# Patient Record
Sex: MALE | Race: WHITE | NOT HISPANIC OR LATINO | Employment: OTHER | URBAN - METROPOLITAN AREA
[De-identification: names, ages, dates, MRNs, and addresses within clinical notes are randomized per-mention and may not be internally consistent; named-entity substitution may affect disease eponyms.]

---

## 2020-07-13 ENCOUNTER — LAB VISIT (OUTPATIENT)
Dept: URGENT CARE | Facility: CLINIC | Age: 50
End: 2020-07-13
Payer: COMMERCIAL

## 2020-07-13 VITALS — TEMPERATURE: 99 F | HEART RATE: 81 BPM | OXYGEN SATURATION: 97 %

## 2020-07-13 DIAGNOSIS — Z01.818 PRE-OP TESTING: ICD-10-CM

## 2020-07-13 PROCEDURE — U0003 INFECTIOUS AGENT DETECTION BY NUCLEIC ACID (DNA OR RNA); SEVERE ACUTE RESPIRATORY SYNDROME CORONAVIRUS 2 (SARS-COV-2) (CORONAVIRUS DISEASE [COVID-19]), AMPLIFIED PROBE TECHNIQUE, MAKING USE OF HIGH THROUGHPUT TECHNOLOGIES AS DESCRIBED BY CMS-2020-01-R: HCPCS

## 2020-07-14 LAB — SARS-COV-2 RNA RESP QL NAA+PROBE: NOT DETECTED

## 2020-07-15 ENCOUNTER — ANESTHESIA EVENT (OUTPATIENT)
Dept: ENDOSCOPY | Facility: OTHER | Age: 50
End: 2020-07-15
Payer: COMMERCIAL

## 2020-07-15 ENCOUNTER — HOSPITAL ENCOUNTER (OUTPATIENT)
Facility: OTHER | Age: 50
Discharge: HOME | End: 2020-07-15
Attending: INTERNAL MEDICINE | Admitting: INTERNAL MEDICINE
Payer: COMMERCIAL

## 2020-07-15 ENCOUNTER — ANESTHESIA (OUTPATIENT)
Dept: ENDOSCOPY | Facility: OTHER | Age: 50
End: 2020-07-15
Payer: COMMERCIAL

## 2020-07-15 VITALS
OXYGEN SATURATION: 98 % | BODY MASS INDEX: 27.09 KG/M2 | TEMPERATURE: 98 F | HEART RATE: 64 BPM | RESPIRATION RATE: 16 BRPM | DIASTOLIC BLOOD PRESSURE: 71 MMHG | HEIGHT: 72 IN | WEIGHT: 200 LBS | SYSTOLIC BLOOD PRESSURE: 120 MMHG

## 2020-07-15 DIAGNOSIS — Z01.818 PRE-OP TESTING: ICD-10-CM

## 2020-07-15 DIAGNOSIS — K64.9 HEMORRHOIDS, UNSPECIFIED HEMORRHOID TYPE: Primary | ICD-10-CM

## 2020-07-15 PROCEDURE — 37000008 HC ANESTHESIA 1ST 15 MINUTES: Performed by: INTERNAL MEDICINE

## 2020-07-15 PROCEDURE — 37000009 HC ANESTHESIA EA ADD 15 MINS: Performed by: INTERNAL MEDICINE

## 2020-07-15 PROCEDURE — 63600175 PHARM REV CODE 636 W HCPCS: Performed by: NURSE ANESTHETIST, CERTIFIED REGISTERED

## 2020-07-15 PROCEDURE — G0121 COLON CA SCRN NOT HI RSK IND: HCPCS | Performed by: INTERNAL MEDICINE

## 2020-07-15 PROCEDURE — 63600175 PHARM REV CODE 636 W HCPCS: Performed by: ANESTHESIOLOGY

## 2020-07-15 RX ORDER — MEPERIDINE HYDROCHLORIDE 25 MG/ML
12.5 INJECTION INTRAMUSCULAR; INTRAVENOUS; SUBCUTANEOUS ONCE AS NEEDED
Status: DISCONTINUED | OUTPATIENT
Start: 2020-07-15 | End: 2020-07-15 | Stop reason: HOSPADM

## 2020-07-15 RX ORDER — ONDANSETRON 2 MG/ML
4 INJECTION INTRAMUSCULAR; INTRAVENOUS DAILY PRN
Status: DISCONTINUED | OUTPATIENT
Start: 2020-07-15 | End: 2020-07-15 | Stop reason: HOSPADM

## 2020-07-15 RX ORDER — BUPROPION HYDROCHLORIDE 100 MG/1
100 TABLET, EXTENDED RELEASE ORAL 2 TIMES DAILY
COMMUNITY
End: 2022-05-27

## 2020-07-15 RX ORDER — PROPOFOL 10 MG/ML
VIAL (ML) INTRAVENOUS
Status: DISCONTINUED | OUTPATIENT
Start: 2020-07-15 | End: 2020-07-15

## 2020-07-15 RX ORDER — METOPROLOL TARTRATE 50 MG/1
50 TABLET ORAL 2 TIMES DAILY
COMMUNITY

## 2020-07-15 RX ORDER — OXYCODONE HYDROCHLORIDE 5 MG/1
5 TABLET ORAL
Status: DISCONTINUED | OUTPATIENT
Start: 2020-07-15 | End: 2020-07-15 | Stop reason: HOSPADM

## 2020-07-15 RX ORDER — SODIUM CHLORIDE, SODIUM LACTATE, POTASSIUM CHLORIDE, CALCIUM CHLORIDE 600; 310; 30; 20 MG/100ML; MG/100ML; MG/100ML; MG/100ML
INJECTION, SOLUTION INTRAVENOUS CONTINUOUS PRN
Status: DISCONTINUED | OUTPATIENT
Start: 2020-07-15 | End: 2020-07-15

## 2020-07-15 RX ORDER — SODIUM CHLORIDE 0.9 % (FLUSH) 0.9 %
3 SYRINGE (ML) INJECTION
Status: DISCONTINUED | OUTPATIENT
Start: 2020-07-15 | End: 2020-07-15 | Stop reason: HOSPADM

## 2020-07-15 RX ORDER — HYDROMORPHONE HYDROCHLORIDE 2 MG/ML
0.4 INJECTION, SOLUTION INTRAMUSCULAR; INTRAVENOUS; SUBCUTANEOUS EVERY 5 MIN PRN
Status: DISCONTINUED | OUTPATIENT
Start: 2020-07-15 | End: 2020-07-15 | Stop reason: HOSPADM

## 2020-07-15 RX ADMIN — PROPOFOL 50 MG: 10 INJECTION, EMULSION INTRAVENOUS at 07:07

## 2020-07-15 RX ADMIN — SODIUM CHLORIDE, SODIUM LACTATE, POTASSIUM CHLORIDE, AND CALCIUM CHLORIDE: 600; 310; 30; 20 INJECTION, SOLUTION INTRAVENOUS at 06:07

## 2020-07-15 NOTE — H&P
[address]  Gastroenterology  H&P    Patient Name: Jake Wallace  MRN: [id number]  Admission Date: 7/15/2020  Code Status: Full Code    Attending Provider:   Primary Care Physician: Eloy Nguyen MD  Principal Problem:<principal problem not specified>    Subjective:     History of Present Illness:  This gent is here for a colon screening and he has no acute GI complaints. He is healthy and is on a beta blocker and anti-coagulant for atrial fibrillation. The procedure was explained in detail.    Past Medical History:   Diagnosis Date    Atrial fibrillation     Generalized anxiety disorder     Hypercholesteremia     Hypogonadism in male     Vitamin D deficiency        History reviewed. No pertinent surgical history.    Review of patient's allergies indicates:   Allergen Reactions    Penicillins      Family History     None        Tobacco Use    Smoking status: Never Smoker    Smokeless tobacco: Never Used   Substance and Sexual Activity    Alcohol use: Not on file    Drug use: Not on file    Sexual activity: Not on file     Review of Systems   Constitutional: Negative.    HENT: Negative.    Eyes: Negative.    Respiratory: Negative.    Cardiovascular: Positive for palpitations.   Gastrointestinal: Negative.    Endocrine: Negative.    Genitourinary: Negative.    Musculoskeletal: Negative.    Skin: Negative.    Allergic/Immunologic: Negative.    Neurological: Negative.    Hematological: Negative.    Psychiatric/Behavioral: Negative.      Objective:     Vital Signs (Most Recent):  Temp: 98.1 °F (36.7 °C) (07/15/20 0624)  Pulse: 66 (07/15/20 0624)  Resp: 16 (07/15/20 0624)  BP: (!) 146/83 (07/15/20 0624)  SpO2: 98 % (07/15/20 0624) Vital Signs (24h Range):  Temp:  [98.1 °F (36.7 °C)] 98.1 °F (36.7 °C)  Pulse:  [66] 66  Resp:  [16] 16  SpO2:  [98 %] 98 %  BP: (146)/(83) 146/83     Weight: 90.7 kg (200 lb) (07/15/20 0624)  Body mass index is 27.12 kg/m².    No intake or output data in the  24 hours ending 07/15/20 0656    Lines/Drains/Airways     Peripheral Intravenous Line                 Peripheral IV - Single Lumen 07/15/20 0641 20 G Left Hand less than 1 day                Physical Exam  Vitals signs and nursing note reviewed. Exam conducted with a chaperone present.   Constitutional:       Appearance: Normal appearance.   HENT:      Head: Normocephalic and atraumatic.      Nose: Nose normal.      Mouth/Throat:      Mouth: Mucous membranes are moist.   Eyes:      General: No scleral icterus.     Extraocular Movements: Extraocular movements intact.      Pupils: Pupils are equal, round, and reactive to light.   Neck:      Musculoskeletal: Normal range of motion and neck supple.   Cardiovascular:      Rate and Rhythm: Normal rate.      Heart sounds: No murmur. No gallop.    Pulmonary:      Effort: Pulmonary effort is normal.      Breath sounds: Normal breath sounds.   Abdominal:      General: Abdomen is flat.      Palpations: Abdomen is soft.   Musculoskeletal: Normal range of motion.   Skin:     General: Skin is warm and dry.   Neurological:      General: No focal deficit present.      Mental Status: He is alert and oriented to person, place, and time.   Psychiatric:         Mood and Affect: Mood normal.         Behavior: Behavior normal.         Thought Content: Thought content normal.         Judgment: Judgment normal.         Significant Labs:  none    Significant Imaging:  none    Assessment/Plan: This gent is quite stable for colon screening     Active Diagnoses:    Diagnosis Date Noted POA    Pre-op testing [Z01.818] 07/15/2020 Not Applicable      Problems Resolved During this Admission:           Nitin Raymond MD  Gastroenterology  [facility]-[address]

## 2020-07-15 NOTE — PROVATION PATIENT INSTRUCTIONS
Discharge Summary/Instructions after an Endoscopic Procedure  Patient Name: Jake Wallace  Patient MRN: [id number]  Patient YOB: 1970  Wednesday, July 15, 2020  Nitin Raymond MD  RESTRICTIONS:  During your procedure today, you received medications for sedation.  These   medications may affect your judgment, balance and coordination.  Therefore,   for 24 hours, you have the following restrictions:   - DO NOT drive a car, operate machinery, make legal/financial decisions,   sign important papers or drink alcohol.    ACTIVITY:  Today: no heavy lifting, straining or running due to procedural   sedation/anesthesia.  The following day: return to full activity including work.  DIET:  Eat and drink normally unless instructed otherwise.     TREATMENT FOR COMMON SIDE EFFECTS:  - Mild abdominal pain, nausea, belching, bloating or excessive gas:  rest,   eat lightly and use a heating pad.  - Sore Throat: treat with throat lozenges and/or gargle with warm salt   water.  - Because air was used during the procedure, expelling large amounts of air   from your rectum or belching is normal.  - If a bowel prep was taken, you may not have a bowel movement for 1-3 days.    This is normal.  SYMPTOMS TO WATCH FOR AND REPORT TO YOUR PHYSICIAN:  1. Abdominal pain or bloating, other than gas cramps.  2. Chest pain.  3. Back pain.  4. Signs of infection such as: chills or fever occurring within 24 hours   after the procedure.  5. Rectal bleeding, which would show as bright red, maroon, or black stools.   (A tablespoon of blood from the rectum is not serious, especially if   hemorrhoids are present.)  6. Vomiting.  7. Weakness or dizziness.  GO DIRECTLY TO THE NEAREST EMERGENCY ROOM IF YOU HAVE ANY OF THE FOLLOWING:      Difficulty breathing              Chills and/or fever over 101 F   Persistent vomiting and/or vomiting blood   Severe abdominal pain   Severe chest pain   Black, tarry stools   Bleeding- more than one  tablespoon   Any other symptom or condition that you feel may need urgent attention  Your doctor recommends these additional instructions:  If any biopsies were taken, your doctors clinic will contact you in 1 to 2   weeks with any results.  - Discharge patient to home (ambulatory).   - Resume previous diet.   - Continue present medications.   - Repeat colonoscopy in 10 years for surveillance.   - Return to GI office PRN.   - Return to primary care physician PRN.  For questions, problems or results please call your physician - Nitin Raymond MD at Work:  [phone].  [facility], EMERGENCY ROOM PHONE NUMBER: [phone], [facility], operated by [facility]   [phone].  IF A COMPLICATION OR EMERGENCY SITUATION ARISES AND YOU ARE UNABLE TO REACH   YOUR PHYSICIAN - GO DIRECTLY TO THE EMERGENCY ROOM.  Nitin Raymond MD  7/15/2020 7:36:25 AM  This report has been verified and signed electronically.  [employer]

## 2020-07-15 NOTE — PLAN OF CARE
Jake Wallace has met all discharge criteria from Phase II. Vital Signs are stable, ambulating  without difficulty. Discharge instructions given, patient verbalized understanding. Discharged from facility via wheelchair in stable condition.

## 2020-07-15 NOTE — ANESTHESIA PREPROCEDURE EVALUATION
07/15/2020  Jake Wallace is a 49 y.o., male.    Anesthesia Evaluation    I have reviewed the Patient Summary Reports.    I have reviewed the Nursing Notes. I have reviewed the NPO Status.      Review of Systems  Anesthesia Hx:  No problems with previous Anesthesia    Social:  Non-Smoker    Cardiovascular:   Dysrhythmias atrial fibrillation    Pulmonary:  Pulmonary Normal    Hepatic/GI:  Hepatic/GI Normal    Endocrine:  Endocrine Normal    Psych:   Psychiatric History          Physical Exam  General:  Well nourished    Airway/Jaw/Neck:  Airway Findings: Mouth Opening: Normal Tongue: Normal  General Airway Assessment: Adult  Mallampati: II  TM Distance: Normal, at least 6 cm  Jaw/Neck Findings:     Neck ROM: Normal ROM      Dental:  Dental Findings: In tact             Anesthesia Plan  Type of Anesthesia, risks & benefits discussed:  Anesthesia Type:  general  Patient's Preference:   Intra-op Monitoring Plan:   Intra-op Monitoring Plan Comments:   Post Op Pain Control Plan:   Post Op Pain Control Plan Comments:   Induction:   IV  Beta Blocker:         Informed Consent: Patient understands risks and agrees with Anesthesia plan.  Questions answered. Anesthesia consent signed with patient.  ASA Score: 3     Day of Surgery Review of History & Physical:    H&P update referred to the surgeon.         Ready For Surgery From Anesthesia Perspective.

## 2020-07-15 NOTE — OP NOTE
This gent underwent an uncomplicated colonoscopy for screeing purposes.    Normal mucosa  Internal hemorrhoids  No polyps.    No complications.  Plan: resume medications and normal diet  Colonoscopy in 10 years

## 2020-07-15 NOTE — DISCHARGE INSTRUCTIONS
Anesthesia: Monitored Anesthesia Care (MAC)    Anesthesia Safety  · Have an adult family member or friend drive you home after the procedure.  · For the first 24 hours after your surgery:  ¨ Do not drive or use heavy equipment.  ¨ Do not make important decisions or sign documents.  ¨ Avoid alcohol.  ¨ Have someone stay with you, if possible. They can watch for problems and help keep you safe.    PLEASE FOLLOW ANY ADDITIONAL INSTRUCTIONS FROM DR. AMEZQUITA

## 2020-07-15 NOTE — ANESTHESIA POSTPROCEDURE EVALUATION
Anesthesia Post Evaluation    Patient: Jake Wallace    Procedure(s) Performed: Procedure(s) (LRB):  COLONOSCOPY (N/A)    Final Anesthesia Type: general    Patient location during evaluation: [facility]  Patient participation: Yes- Able to Participate  Level of consciousness: awake and alert  Post-procedure vital signs: reviewed and stable  Pain management: adequate  Airway patency: patent    PONV status at discharge: No PONV  Anesthetic complications: no      Cardiovascular status: blood pressure returned to baseline  Respiratory status: unassisted, room air and spontaneous ventilation  Hydration status: euvolemic  Follow-up not needed.          Vitals Value Taken Time   /83 07/15/20 0624   Temp 36.7 °C (98.1 °F) 07/15/20 0624   Pulse 66 07/15/20 0624   Resp 16 07/15/20 0624   SpO2 98 % 07/15/20 0624         No case tracking events are documented in the log.      Pain/Jojre Score: No data recorded

## 2020-10-29 ENCOUNTER — IMMUNIZATION (OUTPATIENT)
Dept: PHARMACY | Facility: CLINIC | Age: 50
End: 2020-10-29

## 2020-10-29 ENCOUNTER — IMMUNIZATION (OUTPATIENT)
Dept: PHARMACY | Facility: CLINIC | Age: 50
End: 2020-10-29
Payer: COMMERCIAL

## 2020-11-11 ENCOUNTER — TELEPHONE (OUTPATIENT)
Dept: ELECTROPHYSIOLOGY | Facility: CLINIC | Age: 50
End: 2020-11-11

## 2020-11-11 NOTE — TELEPHONE ENCOUNTER
New AF consult from Dr Francisco Serrano. Can you please call patient to schedule?  Dr Serrano sent a text to Dr Obrien  Thanks

## 2020-11-12 ENCOUNTER — TELEPHONE (OUTPATIENT)
Dept: ELECTROPHYSIOLOGY | Facility: CLINIC | Age: 50
End: 2020-11-12

## 2020-11-12 DIAGNOSIS — I49.8 OTHER SPECIFIED CARDIAC ARRHYTHMIAS: Primary | ICD-10-CM

## 2020-11-12 NOTE — TELEPHONE ENCOUNTER
Spoke with pt who informed me that he is a former pt of Dr Yeh and had an ablation in 2012 or 2013.  Since his ablation, he has not gone back into AF until this past weekend.  Pt recently had an EKG with Dr Serrano who determined that he is back in AF and was therefore referred to Dr Obrien.  Pt has also previously seen Dr Marcin Davis with [facility].  Pt does not have a device.  Pt will come for 7:30 on 11/8/2020 for EKG prior to appt.  He will message us the EKG he has from Dr Serrano.

## 2020-11-16 ENCOUNTER — PATIENT MESSAGE (OUTPATIENT)
Dept: ELECTROPHYSIOLOGY | Facility: CLINIC | Age: 50
End: 2020-11-16

## 2020-11-16 ENCOUNTER — TELEPHONE (OUTPATIENT)
Dept: ELECTROPHYSIOLOGY | Facility: CLINIC | Age: 50
End: 2020-11-16

## 2020-11-16 NOTE — TELEPHONE ENCOUNTER
Spoke with [facility] records who has received the records requests, but because they only process the requests once a week and the pt has not seen either doctor in a few years, his charts will have to be obtained from an outside company and it may take a few days.

## 2020-11-17 NOTE — PROGRESS NOTES
Subjective:    Patient ID:  Jake Wallace is a 50 y.o. male who presents for evaluation of Atrial Fibrillation      HPI 51 yo male with atrial fibrillation and anxiety.  PCP is Dr. Eloy Nguyen. Previously managed by Dr. Yeh and Dr. Davis.  He is an .    Has had paroxysmal symptomatic atrial fibrillation, since . [place] poorly with Flecainide.  PVI (Cryo-balloon) 10/15/13.    Did well for extended period. First episode of atrial fibrillation was week and a half ago. Symptoms are palpitations and fatigue. ECG confirmed atrial fibrillation. Lopressor increased from 50 mg BID to 100 mg BID.     He believes he remained out of rhythm for 8-9 days. Converted back to nsr this past Monday.  Feels back to baseline.      ECG today reveals sinus bradycardia at 57 bpm.    Has not been evaluated for sleep apnea.  6 months ago resumed 1 cup of coffee daily, up to 4 days a week. Has recently discontinued caffeine.  Couple of glasses of wine daily.    His sister had RFA for PVI, and  after her procedure.    Review of Systems   Constitution: Negative. Negative for fever and malaise/fatigue.   HENT: Negative for congestion and sore throat.    Cardiovascular: Negative for chest pain, dyspnea on exertion, irregular heartbeat, leg swelling, near-syncope, orthopnea, palpitations, paroxysmal nocturnal dyspnea and syncope.   Respiratory: Negative for cough and shortness of breath.    Gastrointestinal: Negative for abdominal pain, constipation and diarrhea.   Neurological: Negative for dizziness, light-headedness and weakness.   Psychiatric/Behavioral: Negative for depression. The patient is not nervous/anxious.         Objective:    Physical Exam   Constitutional: He is oriented to person, place, and time. He appears well-developed and well-nourished.   Eyes: Conjunctivae are normal. No scleral icterus.   Neck: No JVD present. No tracheal deviation present.   Cardiovascular: Normal rate, regular rhythm and normal  heart sounds. PMI is not displaced.   Pulmonary/Chest: Effort normal and breath sounds normal. No respiratory distress.   Abdominal: Soft. There is no hepatosplenomegaly. There is no abdominal tenderness.   Musculoskeletal:         General: No tenderness or edema (lower extremity).   Neurological: He is alert and oriented to person, place, and time.   Skin: Skin is warm and dry. No rash noted.   Psychiatric: He has a normal mood and affect. His behavior is normal.         Assessment:       1. Paroxysmal atrial fibrillation    2. Generalized anxiety disorder         Plan:           Paroxysmal atrial fibrillation, symptomatic.  He is s/p PVI, with late recurrence.  Discussed options at length. Given this was his first event since PVI, I would endorse continuing at higher dose of beta blocker, and focusing on lifestyle modification at this time. I do suspect, given the duration of his event, that we will see recurrence at some point.  Reduce wine intake.  Refer for evaluation of sleep apnea.  Weight reduction.    If has recurrence, options would be anti-arrhythmic therapy vs re-do PVI. We discussed risks and benefits of PVI. He would be in favor of trial of anti-arrhythmic therapy (Multaq).    F/u in 6 months.    Will discuss anticoagulation at next visit (CHADSVASc is 0).

## 2020-11-18 ENCOUNTER — OFFICE VISIT (OUTPATIENT)
Dept: ELECTROPHYSIOLOGY | Facility: CLINIC | Age: 50
End: 2020-11-18
Payer: COMMERCIAL

## 2020-11-18 VITALS
HEIGHT: 72 IN | SYSTOLIC BLOOD PRESSURE: 114 MMHG | HEART RATE: 57 BPM | DIASTOLIC BLOOD PRESSURE: 66 MMHG | BODY MASS INDEX: 28.13 KG/M2 | WEIGHT: 207.69 LBS

## 2020-11-18 DIAGNOSIS — I48.0 PAROXYSMAL ATRIAL FIBRILLATION: ICD-10-CM

## 2020-11-18 DIAGNOSIS — F41.1 GENERALIZED ANXIETY DISORDER: ICD-10-CM

## 2020-11-18 DIAGNOSIS — I49.8 OTHER SPECIFIED CARDIAC ARRHYTHMIAS: ICD-10-CM

## 2020-11-18 PROCEDURE — 99205 PR OFFICE/OUTPT VISIT, NEW, LEVL V, 60-74 MIN: ICD-10-PCS | Mod: S$GLB,,, | Performed by: INTERNAL MEDICINE

## 2020-11-18 PROCEDURE — 99999 PR PBB SHADOW E&M-EST. PATIENT-LVL III: ICD-10-PCS | Mod: PBBFAC,,, | Performed by: INTERNAL MEDICINE

## 2020-11-18 PROCEDURE — 1126F PR PAIN SEVERITY QUANTIFIED, NO PAIN PRESENT: ICD-10-PCS | Mod: S$GLB,,, | Performed by: INTERNAL MEDICINE

## 2020-11-18 PROCEDURE — 3008F PR BODY MASS INDEX (BMI) DOCUMENTED: ICD-10-PCS | Mod: CPTII,S$GLB,, | Performed by: INTERNAL MEDICINE

## 2020-11-18 PROCEDURE — 3008F BODY MASS INDEX DOCD: CPT | Mod: CPTII,S$GLB,, | Performed by: INTERNAL MEDICINE

## 2020-11-18 PROCEDURE — 1126F AMNT PAIN NOTED NONE PRSNT: CPT | Mod: S$GLB,,, | Performed by: INTERNAL MEDICINE

## 2020-11-18 PROCEDURE — 93010 RHYTHM STRIP: ICD-10-PCS | Mod: S$GLB,,, | Performed by: INTERNAL MEDICINE

## 2020-11-18 PROCEDURE — 93010 ELECTROCARDIOGRAM REPORT: CPT | Mod: S$GLB,,, | Performed by: INTERNAL MEDICINE

## 2020-11-18 PROCEDURE — 93005 RHYTHM STRIP: ICD-10-PCS | Mod: S$GLB,,, | Performed by: INTERNAL MEDICINE

## 2020-11-18 PROCEDURE — 99205 OFFICE O/P NEW HI 60 MIN: CPT | Mod: S$GLB,,, | Performed by: INTERNAL MEDICINE

## 2020-11-18 PROCEDURE — 99999 PR PBB SHADOW E&M-EST. PATIENT-LVL III: CPT | Mod: PBBFAC,,, | Performed by: INTERNAL MEDICINE

## 2020-11-18 PROCEDURE — 93005 ELECTROCARDIOGRAM TRACING: CPT | Mod: S$GLB,,, | Performed by: INTERNAL MEDICINE

## 2020-11-19 ENCOUNTER — OFFICE VISIT (OUTPATIENT)
Dept: SLEEP MEDICINE | Facility: CLINIC | Age: 50
End: 2020-11-19
Payer: COMMERCIAL

## 2020-11-19 ENCOUNTER — PATIENT MESSAGE (OUTPATIENT)
Dept: ELECTROPHYSIOLOGY | Facility: CLINIC | Age: 50
End: 2020-11-19

## 2020-11-19 DIAGNOSIS — G47.30 SLEEP APNEA, UNSPECIFIED TYPE: Primary | ICD-10-CM

## 2020-11-19 PROCEDURE — 99203 OFFICE O/P NEW LOW 30 MIN: CPT | Mod: 95,,, | Performed by: PSYCHIATRY & NEUROLOGY

## 2020-11-19 PROCEDURE — 99203 PR OFFICE/OUTPT VISIT, NEW, LEVL III, 30-44 MIN: ICD-10-PCS | Mod: 95,,, | Performed by: PSYCHIATRY & NEUROLOGY

## 2020-11-19 NOTE — TELEPHONE ENCOUNTER
Spoke with pt to schedule sleep med consult, as the original order which the pt had discussed with SK would have to be interpreted by SK.  Pt needs to establish with sleep med before doing sleep study.

## 2020-11-19 NOTE — LETTER
November 19, 2020      Cuba Obrien MD  [address]  [facility] [address]           [id number]  [address]  [facility] [address]  Phone: [phone]          Patient: Jake Wallace   MR Number: [id number]   YOB: 1970   Date of Visit: 11/19/2020       Dear Dr. Cuba Obrien:    Thank you for referring Jake Wallace to me for evaluation. Attached you will find relevant portions of my assessment and plan of care.    If you have questions, please do not hesitate to call me. I look forward to following Jake Wallace along with you.    Sincerely,    Yara Aldrich MD    Enclosure  CC:  No Recipients    If you would like to receive this communication electronically, please contact [email or web address] or [phone] to request more information on [employer] access.    For providers and/or their staff who would like to refer a patient to [facility], please contact us through our one-stop-shop provider referral line, [facility], at [phone].    If you feel you have received this communication in error or would no longer like to receive these types of communications, please e-mail [email or web address]

## 2020-11-19 NOTE — PROGRESS NOTES
The patient location is: home  The chief complaint leading to consultation is: sleep disorder  Visit type: audiovisual  Total time spent with patient: 30 min  Each patient to whom he or she provides medical services by telemedicine is:  (1) informed of the relationship between the physician and patient and the respective role of any other health care provider with respect to management of the patient; and (2) notified that he or she may decline to receive medical services by telemedicine and may withdraw from such care at any time.         Jake Wallace is a 50 y.o. male is here to be evaluated for a sleep disorder; referred by Cuba Obrien MD (seen for paroxysmal AFIb)    He reports occasional difficulty falling asleep. In the last 1-2 years, he  Has been experienced early morning awakenings.     The patient reports excessive daytime sleepiness, excessive daytime fatigue and interrupted sleep; not sure of gasping or choking for air (his wife is currently not around to ask).  Reports nightmares - about once a week. At times that would jolt him out of sleep. Denied dreams involving suffocation.     The patient does not feel rested upon awakening. Takes long 1.5 hrs  naps refreshing. Feels  tired in the afternoon.    The patient  denies morning headaches and reports occasional  dry mouth on awakening. Jake Wallace reports  nasal congestion.  The patient never had tonsillectomy, adenoidectomy or UPPP. He had skin surgery on the nose (left side of his nose feels smaller)    The patient reports occasional difficulty falling and staying asleep.    Jake Wallace  denies symptoms concerning for parasomnia except for occasional somniloquy. Reports occasional  episodes of hypnopompic hallucination. Denied sleep paralysis.   The patient  denies auxiliary symptoms of narcolepsy including sleep onset paralysis, sleep attacks and cataplexy. but reports isolated hypnagogic and hypnopompic  hallucination  (since childhood; seem more realistic recently), happening around 11:30 and 3AM   Jake Wallace denied symptoms concerning for RLS; nocturnal leg movements have not been noticed.   The patient does not experience sleep related leg cramps.       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  Bed partner: his wife  Exercise routine: plays tennis  Caffeine:  Not after being diagnosed with AFIB since 2002  Alcohol: glass or wine -2 night;  9 PM  Smoking:no      DME: n/a        PAST MEDICAL HISTORY:    Active Ambulatory Problems     Diagnosis Date Noted    Pre-op testing 07/15/2020    Paroxysmal atrial fibrillation     Generalized anxiety disorder      Resolved Ambulatory Problems     Diagnosis Date Noted    No Resolved Ambulatory Problems     Past Medical History:   Diagnosis Date    Atrial fibrillation     Hypercholesteremia     Hypogonadism in male     Vitamin D deficiency                 PAST SURGICAL HISTORY:    Past Surgical History:   Procedure Laterality Date    COLONOSCOPY N/A 7/15/2020    Procedure: COLONOSCOPY;  Surgeon: Nitin Raymond MD;  Location: [facility];  Service: Endoscopy;  Laterality: N/A;         FAMILY HISTORY:                No family history on file.    SOCIAL HISTORY:          Tobacco:   Social History     Tobacco Use   Smoking Status Never Smoker   Smokeless Tobacco Never Used       alcohol use:    Social History     Substance and Sexual Activity   Alcohol Use Yes    Alcohol/week: 14.0 standard drinks    Types: 14 Shots of liquor per week    Comment: about 2 drinks per day                   ALLERGIES:    Review of patient's allergies indicates:   Allergen Reactions    Penicillins        CURRENT MED.laltelICATIONS:    Current Outpatient Medications    Medication Sig Dispense Refill    apixaban (ELIQUIS) 5 mg Tab Take 5 mg by mouth 2 (two) times daily.      buPROPion (WELLBUTRIN SR) 100 MG TBSR 12 hr tablet Take 100 mg by mouth 2 (two) times daily.       metoprolol tartrate (LOPRESSOR) 50 MG tablet Take 100 mg by mouth 2 (two) times daily.        No current facility-administered medications for this visit.                       REVIEW OF SYSTEMS:   Sleep related symptoms as per HPI  Denies dyspnea  Denies palpitations  Denies acid reflux   Denies polyuria  Denies  mood diturbance  Denies  anemia  Denies  muscle pain  Denies  Gait imbalance    Otherwise, a balance of 10 systems reviewed is negative.    PHYSICAL EXAM:  There were no vitals taken for this visit.           ASSESSMENT:    1. Sleep Apnea NEC. The patient symptomatically has  excessive daytime sleepiness, excessive daytime fatigue and interrupted sleep. The patient has medical co-morbidities of paroxysmal AFIB,  which can be worsened by MITRA. This warrants further investigation for possible obstructive sleep apnea.    2. Isolated Hypnagogic and hypnopompic hallucinations.       PLAN:    Diagnostic: Polysomnogram in lab was recommended to also evaluate for parasomnias. The patient deferred at this time and would prefer to start with a home sleep study. . The nature of this procedure and its indication was discussed with the patient.     HST was ordered. If HST is negative, I will order in lab PSG.      During our discussion today, we talked about the etiology of  MITRA as well as the potential ramifications of untreated sleep apnea, which could include daytime sleepiness, hypertension, heart disease and/or stroke.  We discussed potential treatment options, which could include weight loss, body positioning, continuous positive airway pressure (CPAP), or referral for surgical consideration. Meanwhile, he  is urged to avoid supine sleep, weight gain and alcoholic beverages since all of these can worsen  MITRA.     The patient was given open opportunity to ask questions and/or express concerns about treatment plan. Two point patient identifier confirmed.       Precautions: The patient was advised to abstain from driving should he feel sleepy or drowsy.    Follow up: MD after the sleep study has been completed.     31-minute visit. >50% spent counseling patient and coordination of care.

## 2020-12-01 ENCOUNTER — TELEPHONE (OUTPATIENT)
Dept: SLEEP MEDICINE | Facility: OTHER | Age: 50
End: 2020-12-01

## 2020-12-10 ENCOUNTER — TELEPHONE (OUTPATIENT)
Dept: SLEEP MEDICINE | Facility: OTHER | Age: 50
End: 2020-12-10

## 2020-12-21 ENCOUNTER — TELEPHONE (OUTPATIENT)
Dept: SLEEP MEDICINE | Facility: OTHER | Age: 50
End: 2020-12-21

## 2020-12-22 ENCOUNTER — HOSPITAL ENCOUNTER (OUTPATIENT)
Dept: SLEEP MEDICINE | Facility: OTHER | Age: 50
Discharge: HOME | End: 2020-12-22
Attending: PSYCHIATRY & NEUROLOGY
Payer: COMMERCIAL

## 2020-12-22 DIAGNOSIS — G47.33 OSA (OBSTRUCTIVE SLEEP APNEA): ICD-10-CM

## 2020-12-22 DIAGNOSIS — G47.30 SLEEP APNEA, UNSPECIFIED TYPE: ICD-10-CM

## 2020-12-22 PROCEDURE — 95800 SLP STDY UNATTENDED: CPT | Mod: 26,52,, | Performed by: PSYCHIATRY & NEUROLOGY

## 2020-12-22 PROCEDURE — 95800 PR SLEEP STUDY, UNATTENDED, RECORD HEART RATE/O2 SAT/RESP ANAL/SLEEP TIME: ICD-10-PCS | Mod: 26,52,, | Performed by: PSYCHIATRY & NEUROLOGY

## 2020-12-22 PROCEDURE — 95800 SLP STDY UNATTENDED: CPT | Mod: 52

## 2020-12-23 ENCOUNTER — CLINICAL SUPPORT (OUTPATIENT)
Dept: URGENT CARE | Facility: CLINIC | Age: 50
End: 2020-12-23
Payer: COMMERCIAL

## 2020-12-23 DIAGNOSIS — Z11.9 SCREENING EXAMINATION FOR UNSPECIFIED INFECTIOUS DISEASE: Primary | ICD-10-CM

## 2020-12-23 LAB
CTP QC/QA: YES
SARS-COV-2 RDRP RESP QL NAA+PROBE: NEGATIVE

## 2020-12-23 PROCEDURE — U0002: ICD-10-PCS | Mod: QW,S$GLB,, | Performed by: NURSE PRACTITIONER

## 2020-12-23 PROCEDURE — 99211 OFF/OP EST MAY X REQ PHY/QHP: CPT | Mod: S$GLB,,, | Performed by: NURSE PRACTITIONER

## 2020-12-23 PROCEDURE — 99211 PR OFFICE/OUTPT VISIT, EST, LEVL I: ICD-10-PCS | Mod: S$GLB,,, | Performed by: NURSE PRACTITIONER

## 2020-12-23 PROCEDURE — U0002 COVID-19 LAB TEST NON-CDC: HCPCS | Mod: QW,S$GLB,, | Performed by: NURSE PRACTITIONER

## 2021-01-03 ENCOUNTER — TELEPHONE (OUTPATIENT)
Dept: SLEEP MEDICINE | Facility: CLINIC | Age: 51
End: 2021-01-03

## 2021-01-04 ENCOUNTER — PATIENT MESSAGE (OUTPATIENT)
Dept: SLEEP MEDICINE | Facility: CLINIC | Age: 51
End: 2021-01-04

## 2021-01-13 ENCOUNTER — IMMUNIZATION (OUTPATIENT)
Dept: PHARMACY | Facility: CLINIC | Age: 51
End: 2021-01-13
Payer: COMMERCIAL

## 2021-01-14 ENCOUNTER — HOSPITAL ENCOUNTER (OUTPATIENT)
Dept: RADIOLOGY | Facility: HOSPITAL | Age: 51
Discharge: HOME | End: 2021-01-14
Attending: PHYSICIAN ASSISTANT
Payer: COMMERCIAL

## 2021-01-14 ENCOUNTER — OFFICE VISIT (OUTPATIENT)
Dept: SPORTS MEDICINE | Facility: CLINIC | Age: 51
End: 2021-01-14
Payer: COMMERCIAL

## 2021-01-14 VITALS
HEIGHT: 72 IN | HEART RATE: 71 BPM | WEIGHT: 205 LBS | BODY MASS INDEX: 27.77 KG/M2 | DIASTOLIC BLOOD PRESSURE: 71 MMHG | SYSTOLIC BLOOD PRESSURE: 113 MMHG

## 2021-01-14 DIAGNOSIS — M25.562 ACUTE PAIN OF LEFT KNEE: Primary | ICD-10-CM

## 2021-01-14 DIAGNOSIS — M25.562 LEFT KNEE PAIN, UNSPECIFIED CHRONICITY: ICD-10-CM

## 2021-01-14 PROCEDURE — 73564 XR KNEE ORTHO BILAT WITH FLEXION: ICD-10-PCS | Mod: 26,50,, | Performed by: RADIOLOGY

## 2021-01-14 PROCEDURE — 73564 X-RAY EXAM KNEE 4 OR MORE: CPT | Mod: 26,50,, | Performed by: RADIOLOGY

## 2021-01-14 PROCEDURE — 1125F PR PAIN SEVERITY QUANTIFIED, PAIN PRESENT: ICD-10-PCS | Mod: S$GLB,,, | Performed by: PHYSICIAN ASSISTANT

## 2021-01-14 PROCEDURE — 3008F BODY MASS INDEX DOCD: CPT | Mod: CPTII,S$GLB,, | Performed by: PHYSICIAN ASSISTANT

## 2021-01-14 PROCEDURE — 3008F PR BODY MASS INDEX (BMI) DOCUMENTED: ICD-10-PCS | Mod: CPTII,S$GLB,, | Performed by: PHYSICIAN ASSISTANT

## 2021-01-14 PROCEDURE — 99999 PR PBB SHADOW E&M-EST. PATIENT-LVL III: CPT | Mod: PBBFAC,,, | Performed by: PHYSICIAN ASSISTANT

## 2021-01-14 PROCEDURE — 73564 X-RAY EXAM KNEE 4 OR MORE: CPT | Mod: TC,50

## 2021-01-14 PROCEDURE — 99204 PR OFFICE/OUTPT VISIT, NEW, LEVL IV, 45-59 MIN: ICD-10-PCS | Mod: S$GLB,,, | Performed by: PHYSICIAN ASSISTANT

## 2021-01-14 PROCEDURE — 1125F AMNT PAIN NOTED PAIN PRSNT: CPT | Mod: S$GLB,,, | Performed by: PHYSICIAN ASSISTANT

## 2021-01-14 PROCEDURE — 99999 PR PBB SHADOW E&M-EST. PATIENT-LVL III: ICD-10-PCS | Mod: PBBFAC,,, | Performed by: PHYSICIAN ASSISTANT

## 2021-01-14 PROCEDURE — 99204 OFFICE O/P NEW MOD 45 MIN: CPT | Mod: S$GLB,,, | Performed by: PHYSICIAN ASSISTANT

## 2021-01-29 ENCOUNTER — PATIENT MESSAGE (OUTPATIENT)
Dept: DERMATOLOGY | Facility: CLINIC | Age: 51
End: 2021-01-29

## 2021-02-01 ENCOUNTER — OFFICE VISIT (OUTPATIENT)
Dept: DERMATOLOGY | Facility: CLINIC | Age: 51
End: 2021-02-01
Payer: COMMERCIAL

## 2021-02-01 ENCOUNTER — TELEPHONE (OUTPATIENT)
Dept: DERMATOLOGY | Facility: CLINIC | Age: 51
End: 2021-02-01

## 2021-02-01 VITALS — TEMPERATURE: 97 F

## 2021-02-01 DIAGNOSIS — D22.70 MULTIPLE BENIGN MELANOCYTIC NEVI OF UPPER EXTREMITY, LOWER EXTREMITY, AND TRUNK: ICD-10-CM

## 2021-02-01 DIAGNOSIS — D18.01 ANGIOMA OF SKIN: ICD-10-CM

## 2021-02-01 DIAGNOSIS — L81.4 LENTIGINES: ICD-10-CM

## 2021-02-01 DIAGNOSIS — D48.5 NEOPLASM OF UNCERTAIN BEHAVIOR OF SKIN: Primary | ICD-10-CM

## 2021-02-01 DIAGNOSIS — L82.1 SEBORRHEIC KERATOSIS: ICD-10-CM

## 2021-02-01 DIAGNOSIS — D22.60 MULTIPLE BENIGN MELANOCYTIC NEVI OF UPPER EXTREMITY, LOWER EXTREMITY, AND TRUNK: ICD-10-CM

## 2021-02-01 DIAGNOSIS — D22.5 MULTIPLE BENIGN MELANOCYTIC NEVI OF UPPER EXTREMITY, LOWER EXTREMITY, AND TRUNK: ICD-10-CM

## 2021-02-01 DIAGNOSIS — L73.8 SEBACEOUS HYPERPLASIA: ICD-10-CM

## 2021-02-01 DIAGNOSIS — Z85.828 HISTORY OF NONMELANOMA SKIN CANCER: ICD-10-CM

## 2021-02-01 DIAGNOSIS — L90.5 SCAR: ICD-10-CM

## 2021-02-01 PROCEDURE — 1126F PR PAIN SEVERITY QUANTIFIED, NO PAIN PRESENT: ICD-10-PCS | Mod: S$GLB,,, | Performed by: DERMATOLOGY

## 2021-02-01 PROCEDURE — 99204 PR OFFICE/OUTPT VISIT, NEW, LEVL IV, 45-59 MIN: ICD-10-PCS | Mod: S$GLB,,, | Performed by: DERMATOLOGY

## 2021-02-01 PROCEDURE — 99204 OFFICE O/P NEW MOD 45 MIN: CPT | Mod: S$GLB,,, | Performed by: DERMATOLOGY

## 2021-02-01 PROCEDURE — 1126F AMNT PAIN NOTED NONE PRSNT: CPT | Mod: S$GLB,,, | Performed by: DERMATOLOGY

## 2021-02-02 ENCOUNTER — PATIENT MESSAGE (OUTPATIENT)
Dept: DERMATOLOGY | Facility: CLINIC | Age: 51
End: 2021-02-02

## 2021-02-04 ENCOUNTER — OFFICE VISIT (OUTPATIENT)
Dept: DERMATOLOGY | Facility: CLINIC | Age: 51
End: 2021-02-04
Payer: COMMERCIAL

## 2021-02-04 DIAGNOSIS — D48.5 NEOPLASM OF UNCERTAIN BEHAVIOR OF SKIN: Primary | ICD-10-CM

## 2021-02-04 PROCEDURE — 88305 TISSUE EXAM BY PATHOLOGIST: CPT | Mod: 26,,, | Performed by: PATHOLOGY

## 2021-02-04 PROCEDURE — 88342 IMHCHEM/IMCYTCHM 1ST ANTB: CPT | Mod: 26,,, | Performed by: PATHOLOGY

## 2021-02-04 PROCEDURE — 11301 SHAVE SKIN LESION 0.6-1.0 CM: CPT | Mod: S$GLB,,, | Performed by: DERMATOLOGY

## 2021-02-04 PROCEDURE — 88305 TISSUE EXAM BY PATHOLOGIST: CPT | Performed by: PATHOLOGY

## 2021-02-04 PROCEDURE — 11301 PR SHAV SKIN LES 0.6-1.0 CM TRUNK,ARM,LEG: ICD-10-PCS | Mod: S$GLB,,, | Performed by: DERMATOLOGY

## 2021-02-04 PROCEDURE — 99499 UNLISTED E&M SERVICE: CPT | Mod: S$GLB,,, | Performed by: DERMATOLOGY

## 2021-02-04 PROCEDURE — 99499 NO LOS: ICD-10-PCS | Mod: S$GLB,,, | Performed by: DERMATOLOGY

## 2021-02-04 PROCEDURE — 88305 TISSUE EXAM BY PATHOLOGIST: ICD-10-PCS | Mod: 26,,, | Performed by: PATHOLOGY

## 2021-02-04 PROCEDURE — 1126F PR PAIN SEVERITY QUANTIFIED, NO PAIN PRESENT: ICD-10-PCS | Mod: S$GLB,,, | Performed by: DERMATOLOGY

## 2021-02-04 PROCEDURE — 88342 IMHCHEM/IMCYTCHM 1ST ANTB: CPT | Performed by: PATHOLOGY

## 2021-02-04 PROCEDURE — 1126F AMNT PAIN NOTED NONE PRSNT: CPT | Mod: S$GLB,,, | Performed by: DERMATOLOGY

## 2021-02-04 PROCEDURE — 88342 CHG IMMUNOCYTOCHEMISTRY: ICD-10-PCS | Mod: 26,,, | Performed by: PATHOLOGY

## 2021-02-09 LAB
FINAL PATHOLOGIC DIAGNOSIS: NORMAL
GROSS: NORMAL
MICROSCOPIC EXAM: NORMAL

## 2021-03-10 ENCOUNTER — IMMUNIZATION (OUTPATIENT)
Dept: INTERNAL MEDICINE | Facility: CLINIC | Age: 51
End: 2021-03-10
Payer: COMMERCIAL

## 2021-03-10 DIAGNOSIS — Z23 NEED FOR VACCINATION: Primary | ICD-10-CM

## 2021-03-10 PROCEDURE — 91300 COVID-19, MRNA, LNP-S, PF, 30 MCG/0.3 ML DOSE VACCINE: CPT | Mod: PBBFAC | Performed by: INTERNAL MEDICINE

## 2021-03-31 ENCOUNTER — IMMUNIZATION (OUTPATIENT)
Dept: INTERNAL MEDICINE | Facility: CLINIC | Age: 51
End: 2021-03-31
Payer: COMMERCIAL

## 2021-03-31 DIAGNOSIS — Z23 NEED FOR VACCINATION: Primary | ICD-10-CM

## 2021-03-31 PROCEDURE — 91300 COVID-19, MRNA, LNP-S, PF, 30 MCG/0.3 ML DOSE VACCINE: CPT | Mod: S$GLB,,, | Performed by: INTERNAL MEDICINE

## 2021-03-31 PROCEDURE — 0002A COVID-19, MRNA, LNP-S, PF, 30 MCG/0.3 ML DOSE VACCINE: ICD-10-PCS | Mod: CV19,S$GLB,, | Performed by: INTERNAL MEDICINE

## 2021-03-31 PROCEDURE — 91300 COVID-19, MRNA, LNP-S, PF, 30 MCG/0.3 ML DOSE VACCINE: ICD-10-PCS | Mod: S$GLB,,, | Performed by: INTERNAL MEDICINE

## 2021-03-31 PROCEDURE — 0002A COVID-19, MRNA, LNP-S, PF, 30 MCG/0.3 ML DOSE VACCINE: CPT | Mod: CV19,S$GLB,, | Performed by: INTERNAL MEDICINE

## 2021-06-07 ENCOUNTER — TELEPHONE (OUTPATIENT)
Dept: DERMATOLOGY | Facility: CLINIC | Age: 51
End: 2021-06-07

## 2021-06-07 ENCOUNTER — OFFICE VISIT (OUTPATIENT)
Dept: DERMATOLOGY | Facility: CLINIC | Age: 51
End: 2021-06-07
Payer: COMMERCIAL

## 2021-06-07 ENCOUNTER — PATIENT MESSAGE (OUTPATIENT)
Dept: DERMATOLOGY | Facility: CLINIC | Age: 51
End: 2021-06-07

## 2021-06-07 DIAGNOSIS — D48.5 NEOPLASM OF UNCERTAIN BEHAVIOR OF SKIN: Primary | ICD-10-CM

## 2021-06-07 PROCEDURE — 1126F AMNT PAIN NOTED NONE PRSNT: CPT | Mod: S$GLB,,, | Performed by: DERMATOLOGY

## 2021-06-07 PROCEDURE — 11102 PR TANGENTIAL BIOPSY, SKIN, SINGLE LESION: ICD-10-PCS | Mod: S$GLB,,, | Performed by: DERMATOLOGY

## 2021-06-07 PROCEDURE — 1126F PR PAIN SEVERITY QUANTIFIED, NO PAIN PRESENT: ICD-10-PCS | Mod: S$GLB,,, | Performed by: DERMATOLOGY

## 2021-06-07 PROCEDURE — 88305 TISSUE EXAM BY PATHOLOGIST: ICD-10-PCS | Mod: 26,,, | Performed by: PATHOLOGY

## 2021-06-07 PROCEDURE — 88305 TISSUE EXAM BY PATHOLOGIST: CPT | Performed by: PATHOLOGY

## 2021-06-07 PROCEDURE — 88305 TISSUE EXAM BY PATHOLOGIST: CPT | Mod: 26,,, | Performed by: PATHOLOGY

## 2021-06-07 PROCEDURE — 99499 NO LOS: ICD-10-PCS | Mod: S$GLB,,, | Performed by: DERMATOLOGY

## 2021-06-07 PROCEDURE — 11102 TANGNTL BX SKIN SINGLE LES: CPT | Mod: S$GLB,,, | Performed by: DERMATOLOGY

## 2021-06-07 PROCEDURE — 99499 UNLISTED E&M SERVICE: CPT | Mod: S$GLB,,, | Performed by: DERMATOLOGY

## 2021-06-15 ENCOUNTER — TELEPHONE (OUTPATIENT)
Dept: DERMATOLOGY | Facility: CLINIC | Age: 51
End: 2021-06-15

## 2021-06-15 LAB
FINAL PATHOLOGIC DIAGNOSIS: NORMAL
GROSS: NORMAL
Lab: NORMAL
MICROSCOPIC EXAM: NORMAL

## 2021-06-16 ENCOUNTER — OFFICE VISIT (OUTPATIENT)
Dept: DERMATOLOGY | Facility: CLINIC | Age: 51
End: 2021-06-16
Payer: COMMERCIAL

## 2021-06-16 VITALS
WEIGHT: 205 LBS | HEIGHT: 72 IN | HEART RATE: 74 BPM | BODY MASS INDEX: 27.77 KG/M2 | SYSTOLIC BLOOD PRESSURE: 116 MMHG | DIASTOLIC BLOOD PRESSURE: 71 MMHG

## 2021-06-16 DIAGNOSIS — C44.319 BASAL CELL CARCINOMA OF RIGHT FOREHEAD: Primary | ICD-10-CM

## 2021-06-16 PROCEDURE — 3008F BODY MASS INDEX DOCD: CPT | Mod: CPTII,S$GLB,, | Performed by: DERMATOLOGY

## 2021-06-16 PROCEDURE — 1126F AMNT PAIN NOTED NONE PRSNT: CPT | Mod: S$GLB,,, | Performed by: DERMATOLOGY

## 2021-06-16 PROCEDURE — 99213 OFFICE O/P EST LOW 20 MIN: CPT | Mod: S$GLB,,, | Performed by: DERMATOLOGY

## 2021-06-16 PROCEDURE — 1126F PR PAIN SEVERITY QUANTIFIED, NO PAIN PRESENT: ICD-10-PCS | Mod: S$GLB,,, | Performed by: DERMATOLOGY

## 2021-06-16 PROCEDURE — 99213 PR OFFICE/OUTPT VISIT, EST, LEVL III, 20-29 MIN: ICD-10-PCS | Mod: S$GLB,,, | Performed by: DERMATOLOGY

## 2021-06-16 PROCEDURE — 99999 PR PBB SHADOW E&M-EST. PATIENT-LVL III: ICD-10-PCS | Mod: PBBFAC,,, | Performed by: DERMATOLOGY

## 2021-06-16 PROCEDURE — 99999 PR PBB SHADOW E&M-EST. PATIENT-LVL III: CPT | Mod: PBBFAC,,, | Performed by: DERMATOLOGY

## 2021-06-16 PROCEDURE — 3008F PR BODY MASS INDEX (BMI) DOCUMENTED: ICD-10-PCS | Mod: CPTII,S$GLB,, | Performed by: DERMATOLOGY

## 2021-06-18 ENCOUNTER — TELEPHONE (OUTPATIENT)
Dept: DERMATOLOGY | Facility: CLINIC | Age: 51
End: 2021-06-18

## 2021-06-21 ENCOUNTER — PROCEDURE VISIT (OUTPATIENT)
Dept: DERMATOLOGY | Facility: CLINIC | Age: 51
End: 2021-06-21
Payer: COMMERCIAL

## 2021-06-21 VITALS
BODY MASS INDEX: 27.77 KG/M2 | DIASTOLIC BLOOD PRESSURE: 85 MMHG | SYSTOLIC BLOOD PRESSURE: 136 MMHG | WEIGHT: 205 LBS | HEIGHT: 72 IN | HEART RATE: 59 BPM

## 2021-06-21 DIAGNOSIS — C44.319 BASAL CELL CARCINOMA OF RIGHT FOREHEAD: Primary | ICD-10-CM

## 2021-06-21 PROCEDURE — 99499 UNLISTED E&M SERVICE: CPT | Mod: S$GLB,,, | Performed by: DERMATOLOGY

## 2021-06-21 PROCEDURE — 13132 PR RECMPL WND HEAD,FAC,HAND 2.6-7.5 CM: ICD-10-PCS | Mod: S$GLB,,, | Performed by: DERMATOLOGY

## 2021-06-21 PROCEDURE — 13132 CMPLX RPR F/C/C/M/N/AX/G/H/F: CPT | Mod: S$GLB,,, | Performed by: DERMATOLOGY

## 2021-06-21 PROCEDURE — 99499 NO LOS: ICD-10-PCS | Mod: S$GLB,,, | Performed by: DERMATOLOGY

## 2021-06-21 PROCEDURE — 17311: ICD-10-PCS | Mod: 51,S$GLB,, | Performed by: DERMATOLOGY

## 2021-06-21 PROCEDURE — 17312: ICD-10-PCS | Mod: S$GLB,,, | Performed by: DERMATOLOGY

## 2021-06-21 PROCEDURE — 17311 MOHS 1 STAGE H/N/HF/G: CPT | Mod: 51,S$GLB,, | Performed by: DERMATOLOGY

## 2021-06-21 PROCEDURE — 17312 MOHS ADDL STAGE: CPT | Mod: S$GLB,,, | Performed by: DERMATOLOGY

## 2021-06-21 RX ORDER — HYDROCODONE BITARTRATE AND ACETAMINOPHEN 5; 325 MG/1; MG/1
1 TABLET ORAL EVERY 6 HOURS PRN
Qty: 10 TABLET | Refills: 0 | Status: SHIPPED | OUTPATIENT
Start: 2021-06-21 | End: 2022-05-27

## 2021-06-28 ENCOUNTER — OFFICE VISIT (OUTPATIENT)
Dept: DERMATOLOGY | Facility: CLINIC | Age: 51
End: 2021-06-28
Payer: COMMERCIAL

## 2021-06-28 DIAGNOSIS — Z09 POSTOP CHECK: Primary | ICD-10-CM

## 2021-06-28 PROCEDURE — 99024 PR POST-OP FOLLOW-UP VISIT: ICD-10-PCS | Mod: S$GLB,,, | Performed by: DERMATOLOGY

## 2021-06-28 PROCEDURE — 99999 PR PBB SHADOW E&M-EST. PATIENT-LVL I: ICD-10-PCS | Mod: PBBFAC,,, | Performed by: DERMATOLOGY

## 2021-06-28 PROCEDURE — 1126F AMNT PAIN NOTED NONE PRSNT: CPT | Mod: S$GLB,,, | Performed by: DERMATOLOGY

## 2021-06-28 PROCEDURE — 99024 POSTOP FOLLOW-UP VISIT: CPT | Mod: S$GLB,,, | Performed by: DERMATOLOGY

## 2021-06-28 PROCEDURE — 1126F PR PAIN SEVERITY QUANTIFIED, NO PAIN PRESENT: ICD-10-PCS | Mod: S$GLB,,, | Performed by: DERMATOLOGY

## 2021-06-28 PROCEDURE — 99999 PR PBB SHADOW E&M-EST. PATIENT-LVL I: CPT | Mod: PBBFAC,,, | Performed by: DERMATOLOGY

## 2021-11-08 ENCOUNTER — IMMUNIZATION (OUTPATIENT)
Dept: INTERNAL MEDICINE | Facility: CLINIC | Age: 51
End: 2021-11-08
Payer: COMMERCIAL

## 2021-11-08 PROCEDURE — 90686 IIV4 VACC NO PRSV 0.5 ML IM: CPT | Mod: S$GLB,,, | Performed by: INTERNAL MEDICINE

## 2021-11-08 PROCEDURE — 90686 FLU VACCINE (QUAD) GREATER THAN OR EQUAL TO 3YO PRESERVATIVE FREE IM: ICD-10-PCS | Mod: S$GLB,,, | Performed by: INTERNAL MEDICINE

## 2021-11-08 PROCEDURE — 90471 IMMUNIZATION ADMIN: CPT | Mod: S$GLB,,, | Performed by: INTERNAL MEDICINE

## 2021-11-08 PROCEDURE — 90471 FLU VACCINE (QUAD) GREATER THAN OR EQUAL TO 3YO PRESERVATIVE FREE IM: ICD-10-PCS | Mod: S$GLB,,, | Performed by: INTERNAL MEDICINE

## 2021-11-10 ENCOUNTER — IMMUNIZATION (OUTPATIENT)
Dept: INTERNAL MEDICINE | Facility: CLINIC | Age: 51
End: 2021-11-10
Payer: COMMERCIAL

## 2021-11-10 DIAGNOSIS — Z23 NEED FOR VACCINATION: Primary | ICD-10-CM

## 2021-11-10 PROCEDURE — 91300 COVID-19, MRNA, LNP-S, PF, 30 MCG/0.3 ML DOSE VACCINE: CPT | Mod: PBBFAC | Performed by: INTERNAL MEDICINE

## 2021-11-10 PROCEDURE — 0003A COVID-19, MRNA, LNP-S, PF, 30 MCG/0.3 ML DOSE VACCINE: CPT | Mod: PBBFAC | Performed by: INTERNAL MEDICINE

## 2022-03-03 ENCOUNTER — TELEPHONE (OUTPATIENT)
Dept: SLEEP MEDICINE | Facility: CLINIC | Age: 52
End: 2022-03-03
Payer: COMMERCIAL

## 2022-03-03 NOTE — TELEPHONE ENCOUNTER
----- Message from Ricardo Correa sent at 3/3/2022  9:24 AM CST -----   Name of Who is Calling:     What is the request in detail:  request call back in reference to request copy of patient sleep study results fax was sent on 01/20/22   Please contact to further discuss and advise      Can the clinic reply by [facility]:     What Number to Call Back if not in [facility]:  nupur / jerica /  [phone] / fax# [phone]

## 2022-03-03 NOTE — TELEPHONE ENCOUNTER
Staff returned nupur's call and confirmed the fax number to send over the sleep study. Faxed over his sleep study.

## 2022-04-21 ENCOUNTER — PATIENT MESSAGE (OUTPATIENT)
Dept: ELECTROPHYSIOLOGY | Facility: CLINIC | Age: 52
End: 2022-04-21
Payer: COMMERCIAL

## 2022-04-21 DIAGNOSIS — I49.8 OTHER SPECIFIED CARDIAC ARRHYTHMIAS: Primary | ICD-10-CM

## 2022-05-18 NOTE — PROGRESS NOTES
Mr. Wallace is a patient of Dr. Obrien and was last seen in clinic 2020.      Subjective:   Patient ID:  Jake Wallace is a 51 y.o. male who presents for follow-up of Atrial Fibrillation  .     HPI:    Mr. Wallace is a 51 y.o. male with AF (cryo PVI ), anxiety here for follow up.     Background:    PCP is Dr. Eloy Nguyen. Previously managed by Dr. Yeh and Dr. Davis.  He is an .    Has had paroxysmal symptomatic atrial fibrillation, since . [place] poorly with Flecainide.  PVI (Cryo-balloon) 10/15/13.    Did well for extended period. First episode of atrial fibrillation was week and a half ago. Symptoms are palpitations and fatigue. ECG confirmed atrial fibrillation. Lopressor increased from 50 mg BID to 100 mg BID.   He believes he remained out of rhythm for 8-9 days. Converted back to nsr this past Monday.  Feels back to baseline.  ECG today reveals sinus bradycardia at 57 bpm.    Has not been evaluated for sleep apnea.  6 months ago resumed 1 cup of coffee daily, up to 4 days a week. Has recently discontinued caffeine.  Couple of glasses of wine daily.    His sister had RFA for PVI, and  after her procedure.  Paroxysmal atrial fibrillation, symptomatic.  He is s/p PVI, with late recurrence.  Discussed options at length. Given this was his first event since PVI, I would endorse continuing at higher dose of beta blocker, and focusing on lifestyle modification at this time. I do suspect, given the duration of his event, that we will see recurrence at some point.  Reduce wine intake.  Refer for evaluation of sleep apnea.  Weight reduction.    If has recurrence, options would be anti-arrhythmic therapy vs re-do PVI. We discussed risks and benefits of PVI. He would be in favor of trial of anti-arrhythmic therapy (Multaq).  F/u in 6 months  Will discuss anticoagulation at next visit (CHADSVASc is 0).    Update (2022):    2022: Caroline strips c/w AF in 120s.    Today he  says he feels heart racing when in AF. Was out of rhythm about a week. Otherwise feels well. No CP, PARRA, LH, syncope.   Had home sleep study - mild-moderate MITRA - he has not yet followed up.     He is currently taking eliquis 5mg BID for stroke prophylaxis and denies significant bleeding episodes. He is currently being treated with metoprolol tartrate 50mg BID for HR control.     I have personally reviewed the patient's EKG today, which shows sinis bradycardia at 52bpm. [place] interval is 156. QRS is 102. QT is 414.    Current Outpatient Medications   Medication Sig    apixaban (ELIQUIS) 5 mg Tab Take 5 mg by mouth 2 (two) times daily.    buPROPion (WELLBUTRIN SR) 100 MG TBSR 12 hr tablet Take 100 mg by mouth 2 (two) times daily.     HYDROcodone-acetaminophen (NORCO) 5-325 mg per tablet Take 1 tablet by mouth every 6 (six) hours as needed for Pain.    metoprolol tartrate (LOPRESSOR) 50 MG tablet Take 100 mg by mouth 2 (two) times daily.      No current facility-administered medications for this visit.     Review of Systems   Constitutional: Negative for malaise/fatigue.   Cardiovascular: Positive for irregular heartbeat and palpitations. Negative for chest pain, dyspnea on exertion and leg swelling.   Respiratory: Negative for shortness of breath.    Hematologic/Lymphatic: Negative for bleeding problem.   Skin: Negative for rash.   Musculoskeletal: Negative for myalgias.   Gastrointestinal: Negative for hematemesis, hematochezia and nausea.   Genitourinary: Negative for hematuria.   Neurological: Negative for light-headedness.   Psychiatric/Behavioral: Negative for altered mental status.   Allergic/Immunologic: Negative for persistent infections.     Objective:        /72   Pulse (!) 52   Ht 6' (1.829 m)   Wt 90.6 kg (199 lb 11.8 oz)   BMI 27.09 kg/m²     Physical Exam  Vitals and nursing note reviewed.   Constitutional:       Appearance: Normal appearance. He is well-developed.   HENT:      Head:  Normocephalic.      Nose: Nose normal.   Eyes:      Pupils: Pupils are equal, round, and reactive to light.   Cardiovascular:      Rate and Rhythm: Regular rhythm. Bradycardia present.   Pulmonary:      Effort: No respiratory distress.      Breath sounds: Normal breath sounds.   Musculoskeletal:         General: Normal range of motion.   Skin:     General: Skin is warm and dry.      Findings: No erythema.   Neurological:      Mental Status: He is alert and oriented to person, place, and time.   Psychiatric:         Speech: Speech normal.         Behavior: Behavior normal.       No results found for: NA, K, MG, BUN, CREATININE, ALT, AST, HGB, HCT, TSH, LDLCALC, CBC          Assessment:     1. Paroxysmal atrial fibrillation    2. MITRA (obstructive sleep apnea)    3. Anticoagulant long-term use      Plan:     In summary, Mr. Wallace is a 51 y.o. male with AF (cryo PVI 2013), anxiety here for follow up.   Pt with a second breakthrough AF episode since cryo PVI (prior to that was Oct 2020). We discussed options, including starting multaq vs redo PVI. He would like to continue with metoprolol for now and follow up with sleep medicine (home study indicated mild-mod MITRA) re: MITRA treatment. Labs done at outside facility. Working on cholesterol. Otherwise labs ok. The exercises regularly and BMI controlled. Rafi but asymptomatic.   CHADSVASc 0 and he is on eliquis. Chronic OAC is not currently recommended. He may elect to continue or restart only if he has another AF episode.    Sleep medicine - follow up  Continue current regimen  Contact clinic if AF recurs  RTC 6 mo, sooner if needed    *A copy of this note has been sent to Dr. Obrien*    Follow up in about 6 months (around 11/27/2022).    ------------------------------------------------------------------    SATINDER Yang, NP-C  Cardiac Electrophysiology

## 2022-05-19 ENCOUNTER — IMMUNIZATION (OUTPATIENT)
Dept: INTERNAL MEDICINE | Facility: CLINIC | Age: 52
End: 2022-05-19
Payer: COMMERCIAL

## 2022-05-19 DIAGNOSIS — Z23 NEED FOR VACCINATION: Primary | ICD-10-CM

## 2022-05-19 PROCEDURE — 91305 COVID-19, MRNA, LNP-S, PF, 30 MCG/0.3 ML DOSE VACCINE (PFIZER): CPT | Mod: PBBFAC | Performed by: INTERNAL MEDICINE

## 2022-05-27 ENCOUNTER — OFFICE VISIT (OUTPATIENT)
Dept: ELECTROPHYSIOLOGY | Facility: CLINIC | Age: 52
End: 2022-05-27
Payer: COMMERCIAL

## 2022-05-27 ENCOUNTER — HOSPITAL ENCOUNTER (OUTPATIENT)
Dept: CARDIOLOGY | Facility: CLINIC | Age: 52
Discharge: HOME | End: 2022-05-27
Payer: COMMERCIAL

## 2022-05-27 VITALS
HEART RATE: 52 BPM | WEIGHT: 199.75 LBS | HEIGHT: 72 IN | BODY MASS INDEX: 27.06 KG/M2 | SYSTOLIC BLOOD PRESSURE: 120 MMHG | DIASTOLIC BLOOD PRESSURE: 72 MMHG

## 2022-05-27 DIAGNOSIS — Z79.01 ANTICOAGULANT LONG-TERM USE: ICD-10-CM

## 2022-05-27 DIAGNOSIS — I49.8 OTHER SPECIFIED CARDIAC ARRHYTHMIAS: ICD-10-CM

## 2022-05-27 DIAGNOSIS — G47.33 OSA (OBSTRUCTIVE SLEEP APNEA): ICD-10-CM

## 2022-05-27 DIAGNOSIS — I48.0 PAROXYSMAL ATRIAL FIBRILLATION: Primary | ICD-10-CM

## 2022-05-27 PROCEDURE — 1159F MED LIST DOCD IN RCRD: CPT | Mod: CPTII,S$GLB,, | Performed by: NURSE PRACTITIONER

## 2022-05-27 PROCEDURE — 1160F RVW MEDS BY RX/DR IN RCRD: CPT | Mod: CPTII,S$GLB,, | Performed by: NURSE PRACTITIONER

## 2022-05-27 PROCEDURE — 99999 PR PBB SHADOW E&M-EST. PATIENT-LVL III: ICD-10-PCS | Mod: PBBFAC,,, | Performed by: NURSE PRACTITIONER

## 2022-05-27 PROCEDURE — 3008F PR BODY MASS INDEX (BMI) DOCUMENTED: ICD-10-PCS | Mod: CPTII,S$GLB,, | Performed by: NURSE PRACTITIONER

## 2022-05-27 PROCEDURE — 99214 PR OFFICE/OUTPT VISIT, EST, LEVL IV, 30-39 MIN: ICD-10-PCS | Mod: S$GLB,,, | Performed by: NURSE PRACTITIONER

## 2022-05-27 PROCEDURE — 1160F PR REVIEW ALL MEDS BY PRESCRIBER/CLIN PHARMACIST DOCUMENTED: ICD-10-PCS | Mod: CPTII,S$GLB,, | Performed by: NURSE PRACTITIONER

## 2022-05-27 PROCEDURE — 93010 RHYTHM STRIP: ICD-10-PCS | Mod: S$GLB,,, | Performed by: INTERNAL MEDICINE

## 2022-05-27 PROCEDURE — 3074F SYST BP LT 130 MM HG: CPT | Mod: CPTII,S$GLB,, | Performed by: NURSE PRACTITIONER

## 2022-05-27 PROCEDURE — 99214 OFFICE O/P EST MOD 30 MIN: CPT | Mod: S$GLB,,, | Performed by: NURSE PRACTITIONER

## 2022-05-27 PROCEDURE — 93005 ELECTROCARDIOGRAM TRACING: CPT | Mod: S$GLB,,, | Performed by: INTERNAL MEDICINE

## 2022-05-27 PROCEDURE — 99999 PR PBB SHADOW E&M-EST. PATIENT-LVL III: CPT | Mod: PBBFAC,,, | Performed by: NURSE PRACTITIONER

## 2022-05-27 PROCEDURE — 1159F PR MEDICATION LIST DOCUMENTED IN MEDICAL RECORD: ICD-10-PCS | Mod: CPTII,S$GLB,, | Performed by: NURSE PRACTITIONER

## 2022-05-27 PROCEDURE — 93005 RHYTHM STRIP: ICD-10-PCS | Mod: S$GLB,,, | Performed by: INTERNAL MEDICINE

## 2022-05-27 PROCEDURE — 3078F PR MOST RECENT DIASTOLIC BLOOD PRESSURE < 80 MM HG: ICD-10-PCS | Mod: CPTII,S$GLB,, | Performed by: NURSE PRACTITIONER

## 2022-05-27 PROCEDURE — 3078F DIAST BP <80 MM HG: CPT | Mod: CPTII,S$GLB,, | Performed by: NURSE PRACTITIONER

## 2022-05-27 PROCEDURE — 3074F PR MOST RECENT SYSTOLIC BLOOD PRESSURE < 130 MM HG: ICD-10-PCS | Mod: CPTII,S$GLB,, | Performed by: NURSE PRACTITIONER

## 2022-05-27 PROCEDURE — 3008F BODY MASS INDEX DOCD: CPT | Mod: CPTII,S$GLB,, | Performed by: NURSE PRACTITIONER

## 2022-05-27 PROCEDURE — 93010 ELECTROCARDIOGRAM REPORT: CPT | Mod: S$GLB,,, | Performed by: INTERNAL MEDICINE

## 2022-05-27 RX ORDER — VALACYCLOVIR HYDROCHLORIDE 1 G/1
TABLET, FILM COATED ORAL
COMMUNITY

## 2022-05-27 RX ORDER — TESTOSTERONE GEL, 1% 10 MG/G
GEL TRANSDERMAL
COMMUNITY

## 2022-05-27 RX ORDER — LORAZEPAM 0.5 MG/1
TABLET ORAL
COMMUNITY

## 2022-06-14 ENCOUNTER — OFFICE VISIT (OUTPATIENT)
Dept: DERMATOLOGY | Facility: CLINIC | Age: 52
End: 2022-06-14
Payer: COMMERCIAL

## 2022-06-14 DIAGNOSIS — L81.4 LENTIGINES: Primary | ICD-10-CM

## 2022-06-14 DIAGNOSIS — Z85.828 HISTORY OF NONMELANOMA SKIN CANCER: ICD-10-CM

## 2022-06-14 DIAGNOSIS — D22.60 MULTIPLE BENIGN MELANOCYTIC NEVI OF UPPER EXTREMITY, LOWER EXTREMITY, AND TRUNK: ICD-10-CM

## 2022-06-14 DIAGNOSIS — D18.01 ANGIOMA OF SKIN: ICD-10-CM

## 2022-06-14 DIAGNOSIS — D22.5 MULTIPLE BENIGN MELANOCYTIC NEVI OF UPPER EXTREMITY, LOWER EXTREMITY, AND TRUNK: ICD-10-CM

## 2022-06-14 DIAGNOSIS — L82.1 SEBORRHEIC KERATOSIS: ICD-10-CM

## 2022-06-14 DIAGNOSIS — D22.70 MULTIPLE BENIGN MELANOCYTIC NEVI OF UPPER EXTREMITY, LOWER EXTREMITY, AND TRUNK: ICD-10-CM

## 2022-06-14 DIAGNOSIS — L73.8 SEBACEOUS HYPERPLASIA: ICD-10-CM

## 2022-06-14 PROCEDURE — 99213 OFFICE O/P EST LOW 20 MIN: CPT | Mod: S$GLB,,, | Performed by: DERMATOLOGY

## 2022-06-14 PROCEDURE — 99213 PR OFFICE/OUTPT VISIT, EST, LEVL III, 20-29 MIN: ICD-10-PCS | Mod: S$GLB,,, | Performed by: DERMATOLOGY

## 2022-06-14 PROCEDURE — 1159F MED LIST DOCD IN RCRD: CPT | Mod: CPTII,S$GLB,, | Performed by: DERMATOLOGY

## 2022-06-14 PROCEDURE — 1160F PR REVIEW ALL MEDS BY PRESCRIBER/CLIN PHARMACIST DOCUMENTED: ICD-10-PCS | Mod: CPTII,S$GLB,, | Performed by: DERMATOLOGY

## 2022-06-14 PROCEDURE — 1159F PR MEDICATION LIST DOCUMENTED IN MEDICAL RECORD: ICD-10-PCS | Mod: CPTII,S$GLB,, | Performed by: DERMATOLOGY

## 2022-06-14 PROCEDURE — 1160F RVW MEDS BY RX/DR IN RCRD: CPT | Mod: CPTII,S$GLB,, | Performed by: DERMATOLOGY

## 2022-06-14 NOTE — PROGRESS NOTES
"  Patient Information  Name: Jake Wallace  : 1970  MRN: [id number]     Referring Physician:  No ref. provider found   Primary Care Physician:  Eloy Nguyen MD   Date of Visit: 2022      Subjective:     History of Present lllness:    Jake Wallace is a 51 y.o. male who presents with a chief complaint of mole.  This is a high risk patient with a personal history of nonmelanoma skin cancer who is here today to check for the development of new lesions.  Patient is here today for a "mole" check. He complains of several new bumps and growths on his skin. No itching, bleeding, pain, or rapid growth.    Patient was last seen: 2021.  Prior notes by myself reviewed.   Clinical documentation obtained by nursing staff reviewed.    Review of Systems   Skin: Positive for daily sunscreen use and activity-related sunscreen use.       Objective:   Physical Exam   Constitutional: He appears well-developed and well-nourished. No distress.   HENT:   Mouth/Throat: Lips normal.    Eyes: Lids are normal.  No conjunctival no injection.   Neurological: He is alert and oriented to person, place, and time. He is not disoriented.   Psychiatric: He has a normal mood and affect.   Skin:   Areas Examined (abnormalities noted in diagram):   Scalp / Hair Palpated and Inspected  Head / Face Inspection Performed  Neck Inspection Performed  Chest / Axilla Inspection Performed  Abdomen Inspection Performed  Genitals / Buttocks / Groin Inspection Performed  Back Inspection Performed  RUE Inspected  LUE Inspection Performed  RLE Inspected  LLE Inspection Performed  Nails and Digits Inspection Performed                 Diagram Legend     Erythematous scaling macule/papule c/w actinic keratosis       Vascular papule c/w angioma      Pigmented verrucoid papule/plaque c/w seborrheic keratosis      Yellow umbilicated papule c/w sebaceous hyperplasia      Irregularly shaped tan macule c/w lentigo     1-2 mm smooth white papules " consistent with Milia      Movable subcutaneous cyst with punctum c/w epidermal inclusion cyst      Subcutaneous movable cyst c/w pilar cyst      Firm pink to brown papule c/w dermatofibroma      Pedunculated fleshy papule(s) c/w skin tag(s)      Evenly pigmented macule c/w junctional nevus     Mildly variegated pigmented, slightly irregular-bordered macule c/w mildly atypical nevus      Flesh colored to evenly pigmented papule c/w intradermal nevus       Pink pearly papule/plaque c/w basal cell carcinoma      Erythematous hyperkeratotic cursted plaque c/w SCC      Surgical scar with no sign of skin cancer recurrence      Open and closed comedones      Inflammatory papules and pustules      Verrucoid papule consistent consistent with wart     Erythematous eczematous patches and plaques     Dystrophic onycholytic nail with subungual debris c/w onychomycosis     Umbilicated papule    Erythematous-base heme-crusted tan verrucoid plaque consistent with inflamed seborrheic keratosis     Erythematous Silvery Scaling Plaque c/w Psoriasis     See annotation    No images are attached to the encounter or orders placed in the encounter.      [] Data reviewed  [] Prior external notes reviewed  [] Independent review of test  [] Management discussed with another provider  [] Independent historian    Assessment / Plan:        Lentigines  These are benign sun spots which should be monitored for changes. Daily sun protection will reduce the number of new lesions.   Recommend using a broad-spectrum, water-resistant sunscreen with SPF of 30 or higher--reapply every 2 hours. Seek shade, wear sun-protective clothing, and perform regular skin self-exams.    Sebaceous hyperplasia  This is a common condition representing benign enlargement of oil glands. It typically occurs in adulthood. Reassurance was given to the patient. No treatment required.    Multiple benign melanocytic nevi of upper extremity, lower extremity, and trunk  Multiple  benign-appearing nevi present on exam today. Reassurance provided. Counseled patient to periodically examine moles and return to clinic if any changes in size, shape, or color are noted or if it becomes symptomatic (bleeding, itching, pain, etc).  Recommend using a broad-spectrum, water-resistant sunscreen with SPF of 30 or higher--reapply every 2 hours. Seek shade, wear sun-protective clothing, and perform regular skin self-exams.    Seborrheic keratosis  These are benign, inherited growths without a malignant potential. Reassurance given to patient. No treatment is necessary.    Angioma of skin  These are benign vascular lesions that are inherited. Treatment is not necessary.    History of nonmelanoma skin cancer   - stable and chronic  Area(s) of previous nonmelanoma skin cancer evaluated with no evidence of recurrence. Reassurance provided.  Recommend using a broad-spectrum, water-resistant sunscreen with SPF of 30 or higher--reapply every 2 hours. Seek shade, wear sun-protective clothing, and perform regular skin self-exams.    Follow up in about 1 year (around 6/14/2023) for TBSE, or sooner if any new problems or changing lesions.      Tiff Hernandez MD, FAAD  [facility]

## 2022-07-11 ENCOUNTER — OFFICE VISIT (OUTPATIENT)
Dept: SLEEP MEDICINE | Facility: CLINIC | Age: 52
End: 2022-07-11
Payer: COMMERCIAL

## 2022-07-11 VITALS — HEART RATE: 55 BPM | SYSTOLIC BLOOD PRESSURE: 128 MMHG | DIASTOLIC BLOOD PRESSURE: 83 MMHG

## 2022-07-11 DIAGNOSIS — I48.0 PAROXYSMAL ATRIAL FIBRILLATION: Primary | ICD-10-CM

## 2022-07-11 DIAGNOSIS — F41.1 GENERALIZED ANXIETY DISORDER: ICD-10-CM

## 2022-07-11 DIAGNOSIS — G47.33 OSA (OBSTRUCTIVE SLEEP APNEA): ICD-10-CM

## 2022-07-11 PROCEDURE — 99212 PR OFFICE/OUTPT VISIT, EST, LEVL II, 10-19 MIN: ICD-10-PCS | Mod: S$GLB,,, | Performed by: INTERNAL MEDICINE

## 2022-07-11 PROCEDURE — 1160F RVW MEDS BY RX/DR IN RCRD: CPT | Mod: CPTII,S$GLB,, | Performed by: INTERNAL MEDICINE

## 2022-07-11 PROCEDURE — 99999 PR PBB SHADOW E&M-EST. PATIENT-LVL III: ICD-10-PCS | Mod: PBBFAC,,, | Performed by: INTERNAL MEDICINE

## 2022-07-11 PROCEDURE — 3079F PR MOST RECENT DIASTOLIC BLOOD PRESSURE 80-89 MM HG: ICD-10-PCS | Mod: CPTII,S$GLB,, | Performed by: INTERNAL MEDICINE

## 2022-07-11 PROCEDURE — 3074F PR MOST RECENT SYSTOLIC BLOOD PRESSURE < 130 MM HG: ICD-10-PCS | Mod: CPTII,S$GLB,, | Performed by: INTERNAL MEDICINE

## 2022-07-11 PROCEDURE — 1159F PR MEDICATION LIST DOCUMENTED IN MEDICAL RECORD: ICD-10-PCS | Mod: CPTII,S$GLB,, | Performed by: INTERNAL MEDICINE

## 2022-07-11 PROCEDURE — 3079F DIAST BP 80-89 MM HG: CPT | Mod: CPTII,S$GLB,, | Performed by: INTERNAL MEDICINE

## 2022-07-11 PROCEDURE — 99999 PR PBB SHADOW E&M-EST. PATIENT-LVL III: CPT | Mod: PBBFAC,,, | Performed by: INTERNAL MEDICINE

## 2022-07-11 PROCEDURE — 1160F PR REVIEW ALL MEDS BY PRESCRIBER/CLIN PHARMACIST DOCUMENTED: ICD-10-PCS | Mod: CPTII,S$GLB,, | Performed by: INTERNAL MEDICINE

## 2022-07-11 PROCEDURE — 3074F SYST BP LT 130 MM HG: CPT | Mod: CPTII,S$GLB,, | Performed by: INTERNAL MEDICINE

## 2022-07-11 PROCEDURE — 1159F MED LIST DOCD IN RCRD: CPT | Mod: CPTII,S$GLB,, | Performed by: INTERNAL MEDICINE

## 2022-07-11 PROCEDURE — 99212 OFFICE O/P EST SF 10 MIN: CPT | Mod: S$GLB,,, | Performed by: INTERNAL MEDICINE

## 2022-07-11 NOTE — PROGRESS NOTES
Subjective:       Patient ID: Jake Wallace is a 51 y.o. male.    Chief Complaint: Sleeping Problem    HPI     Jake Wallace presents with recurrent atrial fibrillation.   Past history of HST showing mild MITRA.   Discounted results due to severe sleep disruption from discomfort of home testing unit.   Continues to have snoring, restless sleep and daytime fatigue.     Review of Systems      Objective:      Physical Exam    Assessment:       Problem List Items Addressed This Visit        Psychiatric    Generalized anxiety disorder       Cardiac/Vascular    Paroxysmal atrial fibrillation - Primary       Other    MITRA (obstructive sleep apnea)          Plan:       Due to listed symptoms, a HST is recommended and ordered.  The pathophysiology of MITRA was discussed.   The effects of MITRA on patient's co-morbid conditions and the increased morbidity and/or mortality associated with this condition were reviewed.   The patient was given open opportunity to ask questions and/or express concerns about treatment plan.   All questions/concerns were discussed.   Driving precautions were provided.       Thank you for referring Jake Wallace for evaluation.

## 2022-10-03 ENCOUNTER — PATIENT MESSAGE (OUTPATIENT)
Dept: DERMATOLOGY | Facility: CLINIC | Age: 52
End: 2022-10-03
Payer: COMMERCIAL

## 2022-10-03 ENCOUNTER — TELEPHONE (OUTPATIENT)
Dept: DERMATOLOGY | Facility: CLINIC | Age: 52
End: 2022-10-03
Payer: COMMERCIAL

## 2022-10-03 NOTE — TELEPHONE ENCOUNTER
Spoke to pt and informed him that Dr. Palmer requires a path report of proven skin cancer to perform Mohs. Pt is working to find an appt with general derm for biopsy.

## 2022-11-03 ENCOUNTER — IMMUNIZATION (OUTPATIENT)
Dept: INTERNAL MEDICINE | Facility: CLINIC | Age: 52
End: 2022-11-03
Payer: COMMERCIAL

## 2022-11-03 DIAGNOSIS — Z23 NEED FOR VACCINATION: Primary | ICD-10-CM

## 2022-11-03 PROCEDURE — 91312 COVID-19, MRNA, LNP-S, BIVALENT BOOSTER, PF, 30 MCG/0.3 ML DOSE: ICD-10-PCS | Mod: S$GLB,,, | Performed by: INTERNAL MEDICINE

## 2022-11-03 PROCEDURE — 91312 COVID-19, MRNA, LNP-S, BIVALENT BOOSTER, PF, 30 MCG/0.3 ML DOSE: CPT | Mod: S$GLB,,, | Performed by: INTERNAL MEDICINE

## 2022-11-03 PROCEDURE — 0124A COVID-19, MRNA, LNP-S, BIVALENT BOOSTER, PF, 30 MCG/0.3 ML DOSE: CPT | Mod: CV19,PBBFAC | Performed by: INTERNAL MEDICINE

## 2022-11-05 ENCOUNTER — IMMUNIZATION (OUTPATIENT)
Dept: INTERNAL MEDICINE | Facility: CLINIC | Age: 52
End: 2022-11-05
Payer: COMMERCIAL

## 2022-11-05 PROCEDURE — 90471 FLU VACCINE (QUAD) GREATER THAN OR EQUAL TO 3YO PRESERVATIVE FREE IM: ICD-10-PCS | Mod: S$GLB,,, | Performed by: INTERNAL MEDICINE

## 2022-11-05 PROCEDURE — 90471 IMMUNIZATION ADMIN: CPT | Mod: S$GLB,,, | Performed by: INTERNAL MEDICINE

## 2022-11-05 PROCEDURE — 90686 FLU VACCINE (QUAD) GREATER THAN OR EQUAL TO 3YO PRESERVATIVE FREE IM: ICD-10-PCS | Mod: S$GLB,,, | Performed by: INTERNAL MEDICINE

## 2022-11-05 PROCEDURE — 90686 IIV4 VACC NO PRSV 0.5 ML IM: CPT | Mod: S$GLB,,, | Performed by: INTERNAL MEDICINE

## 2022-11-18 NOTE — PROGRESS NOTES
Mr. Wallace is a patient of Dr. Obrien and was last seen in clinic 2022.      Subjective:   Patient ID:  Jake Wallace is a 52 y.o. male who presents for follow-up of Atrial Fibrillation  .     HPI:    Mr. Wallace is a 52 y.o. male with AF (cryo PVI ), anxiety here for follow up.     Background:    PCP is Dr. Eloy Nguyen. Previously managed by Dr. Yeh and Dr. Davis.  He is an .    Has had paroxysmal symptomatic atrial fibrillation, since . [place] poorly with Flecainide.  PVI (Cryo-balloon) 10/15/13.    Did well for extended period. First episode of atrial fibrillation was week and a half ago. Symptoms are palpitations and fatigue. ECG confirmed atrial fibrillation. Lopressor increased from 50 mg BID to 100 mg BID.   He believes he remained out of rhythm for 8-9 days. Converted back to nsr this past Monday.  Feels back to baseline.  ECG today reveals sinus bradycardia at 57 bpm.    Has not been evaluated for sleep apnea.  6 months ago resumed 1 cup of coffee daily, up to 4 days a week. Has recently discontinued caffeine.  Couple of glasses of wine daily.    His sister had RFA for PVI, and  after her procedure.  Paroxysmal atrial fibrillation, symptomatic.  He is s/p PVI, with late recurrence.  Discussed options at length. Given this was his first event since PVI, I would endorse continuing at higher dose of beta blocker, and focusing on lifestyle modification at this time. I do suspect, given the duration of his event, that we will see recurrence at some point.  Reduce wine intake.  Refer for evaluation of sleep apnea.  Weight reduction.    If has recurrence, options would be anti-arrhythmic therapy vs re-do PVI. We discussed risks and benefits of PVI. He would be in favor of trial of anti-arrhythmic therapy (Multaq).  Will discuss anticoagulation at next visit (CHADSVASc is 0).    2022: Caroline blanc c/w AF in 120s.    2022: Pt with a second breakthrough AF episode  since cryo PVI (prior to that was Oct 2020). We discussed options, including starting multaq vs redo PVI. He would like to continue with metoprolol for now and follow up with sleep medicine (home study indicated mild-mod MITRA) re: MITRA treatment. Labs done at outside facility. Working on cholesterol. Otherwise labs ok. The exercises regularly and BMI controlled. Rafi but asymptomatic.   CHADSVASc 0 and he is on eliquis. Chronic OAC is not currently recommended. He may elect to continue or restart only if he has another AF episode.    Update (11/28/2022):    Today he reports he had another AF episode a month ago (10/11/2022) lasting 9 days. Has traveled prior to episode. Lots of restaurants and staying up late. Now has an Apple Watch to track for AF (although he is symptomatic as well). Otherwise feels well - no chest pain with exertion or at rest, SOB, PARRA, dizziness, or syncope. He did test positive for mild MITRA and is going to get a CPAP.     CHADSVASc 0 not on OAC. He is currently being treated with metoprolol tartrate 50mg BID for HR control.     I have personally reviewed the patient's EKG today, which shows sinus bradycardia at 53bpm. [place] interval is 152. QRS is 82. QT is 414.    Current Outpatient Medications   Medication Sig    LORazepam (ATIVAN) 0.5 MG tablet lorazepam 0.5 mg tablet   TAKE 1 TABLET BY MOUTH TWICE A DAY AS NEEDED FOR ANXIETY    metoprolol tartrate (LOPRESSOR) 50 MG tablet Take 50 mg by mouth 2 (two) times daily.    testosterone (ANDROGEL) 1 % (50 mg/5 gram) GlPk testosterone 50 mg/5 gram (1 %) transdermal gel   APPLY CONTENTS OF ONE PACKET TO SKIN DAILY AS DIRECTED    valACYclovir (VALTREX) 1000 MG tablet valacyclovir 1 gram tablet     No current facility-administered medications for this visit.     Review of Systems   Constitutional: Negative for malaise/fatigue.   Cardiovascular:  Positive for palpitations. Negative for chest pain, dyspnea on exertion, irregular heartbeat and leg swelling.  "  Respiratory:  Negative for shortness of breath.    Hematologic/Lymphatic: Negative for bleeding problem.   Skin:  Negative for rash.   Musculoskeletal:  Negative for myalgias.   Gastrointestinal:  Negative for hematemesis, hematochezia and nausea.   Genitourinary:  Negative for hematuria.   Neurological:  Negative for light-headedness.   Psychiatric/Behavioral:  Negative for altered mental status.    Allergic/Immunologic: Negative for persistent infections.     Objective:          /75   Ht 6' 1" (1.854 m)   Wt 93.2 kg (205 lb 7.5 oz)   BMI 27.11 kg/m²     Physical Exam  Vitals and nursing note reviewed.   Constitutional:       Appearance: Normal appearance. He is well-developed.   HENT:      Head: Normocephalic.      Nose: Nose normal.   Eyes:      Pupils: Pupils are equal, round, and reactive to light.   Cardiovascular:      Rate and Rhythm: Regular rhythm. Bradycardia present.   Pulmonary:      Effort: No respiratory distress.      Breath sounds: Normal breath sounds.   Musculoskeletal:         General: Normal range of motion.   Skin:     General: Skin is warm and dry.      Findings: No erythema.   Neurological:      Mental Status: He is alert and oriented to person, place, and time.   Psychiatric:         Speech: Speech normal.         Behavior: Behavior normal.     No results found for: NA, K, MG, BUN, CREATININE, ALT, AST, HGB, HCT, TSH, LDLCALC, CBC            Assessment:     1. Paroxysmal atrial fibrillation    2. MITRA (obstructive sleep apnea)    3. Status post cryoablation of arrhythmia (2013)      Plan:     In summary, Mr. Wallace is a 52 y.o. male with AF (cryo PVI 2013), anxiety here for follow up.   Did have another episode of AF last month lasting ~9 days. We discussed starting multaq vs redo PVI vs continue to monitor. He would like to start his CPAP and reassess if he has another episode. He is monitoring rhythm via Smart Watch and will notify the clinic if his AF recurs. CHADSVASc 0 not " on OAC. He remains on metoprolol.    CPAP  Continue to monitor for arrhythmia  RTC 1 yr if WNL, sooner if needed    *A copy of this note has been sent to Dr. Obrien*    Follow up in about 1 year (around 11/28/2023).    ------------------------------------------------------------------    SATINDER Yang, NP-C  Cardiac Electrophysiology

## 2022-11-25 ENCOUNTER — TELEPHONE (OUTPATIENT)
Dept: ELECTROPHYSIOLOGY | Facility: CLINIC | Age: 52
End: 2022-11-25
Payer: COMMERCIAL

## 2022-11-28 ENCOUNTER — OFFICE VISIT (OUTPATIENT)
Dept: ELECTROPHYSIOLOGY | Facility: CLINIC | Age: 52
End: 2022-11-28
Payer: COMMERCIAL

## 2022-11-28 ENCOUNTER — HOSPITAL ENCOUNTER (OUTPATIENT)
Dept: CARDIOLOGY | Facility: CLINIC | Age: 52
Discharge: HOME | End: 2022-11-28
Payer: COMMERCIAL

## 2022-11-28 VITALS
DIASTOLIC BLOOD PRESSURE: 75 MMHG | WEIGHT: 205.5 LBS | HEIGHT: 73 IN | BODY MASS INDEX: 27.23 KG/M2 | SYSTOLIC BLOOD PRESSURE: 125 MMHG

## 2022-11-28 DIAGNOSIS — Z98.890 STATUS POST CRYOABLATION OF ARRHYTHMIA: ICD-10-CM

## 2022-11-28 DIAGNOSIS — I48.0 PAROXYSMAL ATRIAL FIBRILLATION: Primary | ICD-10-CM

## 2022-11-28 DIAGNOSIS — Z86.79 STATUS POST CRYOABLATION OF ARRHYTHMIA: ICD-10-CM

## 2022-11-28 DIAGNOSIS — I49.8 OTHER SPECIFIED CARDIAC ARRHYTHMIAS: ICD-10-CM

## 2022-11-28 DIAGNOSIS — G47.33 OSA (OBSTRUCTIVE SLEEP APNEA): ICD-10-CM

## 2022-11-28 PROCEDURE — 3074F SYST BP LT 130 MM HG: CPT | Mod: CPTII,S$GLB,, | Performed by: NURSE PRACTITIONER

## 2022-11-28 PROCEDURE — 93010 ELECTROCARDIOGRAM REPORT: CPT | Mod: S$GLB,,, | Performed by: INTERNAL MEDICINE

## 2022-11-28 PROCEDURE — 93005 ELECTROCARDIOGRAM TRACING: CPT | Mod: S$GLB,,, | Performed by: INTERNAL MEDICINE

## 2022-11-28 PROCEDURE — 3008F PR BODY MASS INDEX (BMI) DOCUMENTED: ICD-10-PCS | Mod: CPTII,S$GLB,, | Performed by: NURSE PRACTITIONER

## 2022-11-28 PROCEDURE — 99214 OFFICE O/P EST MOD 30 MIN: CPT | Mod: S$GLB,,, | Performed by: NURSE PRACTITIONER

## 2022-11-28 PROCEDURE — 1160F RVW MEDS BY RX/DR IN RCRD: CPT | Mod: CPTII,S$GLB,, | Performed by: NURSE PRACTITIONER

## 2022-11-28 PROCEDURE — 3078F PR MOST RECENT DIASTOLIC BLOOD PRESSURE < 80 MM HG: ICD-10-PCS | Mod: CPTII,S$GLB,, | Performed by: NURSE PRACTITIONER

## 2022-11-28 PROCEDURE — 93005 RHYTHM STRIP: ICD-10-PCS | Mod: S$GLB,,, | Performed by: INTERNAL MEDICINE

## 2022-11-28 PROCEDURE — 99999 PR PBB SHADOW E&M-EST. PATIENT-LVL III: CPT | Mod: PBBFAC,,, | Performed by: NURSE PRACTITIONER

## 2022-11-28 PROCEDURE — 99999 PR PBB SHADOW E&M-EST. PATIENT-LVL III: ICD-10-PCS | Mod: PBBFAC,,, | Performed by: NURSE PRACTITIONER

## 2022-11-28 PROCEDURE — 3008F BODY MASS INDEX DOCD: CPT | Mod: CPTII,S$GLB,, | Performed by: NURSE PRACTITIONER

## 2022-11-28 PROCEDURE — 3078F DIAST BP <80 MM HG: CPT | Mod: CPTII,S$GLB,, | Performed by: NURSE PRACTITIONER

## 2022-11-28 PROCEDURE — 1160F PR REVIEW ALL MEDS BY PRESCRIBER/CLIN PHARMACIST DOCUMENTED: ICD-10-PCS | Mod: CPTII,S$GLB,, | Performed by: NURSE PRACTITIONER

## 2022-11-28 PROCEDURE — 93010 RHYTHM STRIP: ICD-10-PCS | Mod: S$GLB,,, | Performed by: INTERNAL MEDICINE

## 2022-11-28 PROCEDURE — 1159F MED LIST DOCD IN RCRD: CPT | Mod: CPTII,S$GLB,, | Performed by: NURSE PRACTITIONER

## 2022-11-28 PROCEDURE — 99214 PR OFFICE/OUTPT VISIT, EST, LEVL IV, 30-39 MIN: ICD-10-PCS | Mod: S$GLB,,, | Performed by: NURSE PRACTITIONER

## 2022-11-28 PROCEDURE — 3074F PR MOST RECENT SYSTOLIC BLOOD PRESSURE < 130 MM HG: ICD-10-PCS | Mod: CPTII,S$GLB,, | Performed by: NURSE PRACTITIONER

## 2022-11-28 PROCEDURE — 1159F PR MEDICATION LIST DOCUMENTED IN MEDICAL RECORD: ICD-10-PCS | Mod: CPTII,S$GLB,, | Performed by: NURSE PRACTITIONER

## 2023-01-17 ENCOUNTER — PATIENT MESSAGE (OUTPATIENT)
Dept: ELECTROPHYSIOLOGY | Facility: CLINIC | Age: 53
End: 2023-01-17
Payer: COMMERCIAL

## 2023-01-31 ENCOUNTER — OFFICE VISIT (OUTPATIENT)
Dept: DERMATOLOGY | Facility: CLINIC | Age: 53
End: 2023-01-31
Payer: COMMERCIAL

## 2023-01-31 DIAGNOSIS — L81.4 LENTIGINES: ICD-10-CM

## 2023-01-31 DIAGNOSIS — Z12.83 SCREENING EXAM FOR SKIN CANCER: ICD-10-CM

## 2023-01-31 DIAGNOSIS — D22.70 MULTIPLE BENIGN MELANOCYTIC NEVI OF UPPER EXTREMITY, LOWER EXTREMITY, AND TRUNK: Primary | ICD-10-CM

## 2023-01-31 DIAGNOSIS — D22.60 MULTIPLE BENIGN MELANOCYTIC NEVI OF UPPER EXTREMITY, LOWER EXTREMITY, AND TRUNK: Primary | ICD-10-CM

## 2023-01-31 DIAGNOSIS — Z85.828 HISTORY OF NONMELANOMA SKIN CANCER: ICD-10-CM

## 2023-01-31 DIAGNOSIS — D22.5 MULTIPLE BENIGN MELANOCYTIC NEVI OF UPPER EXTREMITY, LOWER EXTREMITY, AND TRUNK: Primary | ICD-10-CM

## 2023-01-31 DIAGNOSIS — L82.1 SEBORRHEIC KERATOSIS: ICD-10-CM

## 2023-01-31 PROCEDURE — 99213 OFFICE O/P EST LOW 20 MIN: CPT | Mod: S$GLB,,, | Performed by: DERMATOLOGY

## 2023-01-31 PROCEDURE — 1160F RVW MEDS BY RX/DR IN RCRD: CPT | Mod: CPTII,S$GLB,, | Performed by: DERMATOLOGY

## 2023-01-31 PROCEDURE — 1160F PR REVIEW ALL MEDS BY PRESCRIBER/CLIN PHARMACIST DOCUMENTED: ICD-10-PCS | Mod: CPTII,S$GLB,, | Performed by: DERMATOLOGY

## 2023-01-31 PROCEDURE — 1159F MED LIST DOCD IN RCRD: CPT | Mod: CPTII,S$GLB,, | Performed by: DERMATOLOGY

## 2023-01-31 PROCEDURE — 1159F PR MEDICATION LIST DOCUMENTED IN MEDICAL RECORD: ICD-10-PCS | Mod: CPTII,S$GLB,, | Performed by: DERMATOLOGY

## 2023-01-31 PROCEDURE — 99213 PR OFFICE/OUTPT VISIT, EST, LEVL III, 20-29 MIN: ICD-10-PCS | Mod: S$GLB,,, | Performed by: DERMATOLOGY

## 2023-01-31 NOTE — PROGRESS NOTES
"  Patient Information  Name: Jake Wallace  : 1970  MRN: [id number]     Referring Physician:  No ref. provider found   Primary Care Physician:  Eloy Nguyen MD   Date of Visit: 2023      Subjective:     History of Present lllness:    Jake Wallace is a 52 y.o. male who presents with a chief complaint of moles.  This is a high risk patient with a personal history of nonmelanoma skin cancer who is here today to check for the development of new lesions.  Patient is here today for a "mole" check.     Today, patient has no additional complaints. Denies any new, changing, or symptomatic lesions on the skin.    Patient was last seen: 2022.  Prior notes by myself reviewed.   Clinical documentation obtained by nursing staff reviewed.    Review of Systems   Skin:  Positive for daily sunscreen use, activity-related sunscreen use and wears hat. Negative for itching and rash.     Objective:   Physical Exam   Constitutional: He appears well-developed and well-nourished. No distress.   HENT:   Mouth/Throat: Lips normal.    Eyes: Lids are normal.  No conjunctival no injection.   Neurological: He is alert and oriented to person, place, and time. He is not disoriented.   Psychiatric: He has a normal mood and affect.   Skin:   Areas Examined (abnormalities noted in diagram):   Scalp / Hair Palpated and Inspected  Head / Face Inspection Performed  Neck Inspection Performed  Chest / Axilla Inspection Performed  Abdomen Inspection Performed  Genitals / Buttocks / Groin Inspection Performed  Back Inspection Performed  RUE Inspected  LUE Inspection Performed  RLE Inspected  LLE Inspection Performed  Nails and Digits Inspection Performed               Diagram Legend     Erythematous scaling macule/papule c/w actinic keratosis       Vascular papule c/w angioma      Pigmented verrucoid papule/plaque c/w seborrheic keratosis      Yellow umbilicated papule c/w sebaceous hyperplasia      Irregularly shaped tan " macule c/w lentigo     1-2 mm smooth white papules consistent with Milia      Movable subcutaneous cyst with punctum c/w epidermal inclusion cyst      Subcutaneous movable cyst c/w pilar cyst      Firm pink to brown papule c/w dermatofibroma      Pedunculated fleshy papule(s) c/w skin tag(s)      Evenly pigmented macule c/w junctional nevus     Mildly variegated pigmented, slightly irregular-bordered macule c/w mildly atypical nevus      Flesh colored to evenly pigmented papule c/w intradermal nevus       Pink pearly papule/plaque c/w basal cell carcinoma      Erythematous hyperkeratotic cursted plaque c/w SCC      Surgical scar with no sign of skin cancer recurrence      Open and closed comedones      Inflammatory papules and pustules      Verrucoid papule consistent consistent with wart     Erythematous eczematous patches and plaques     Dystrophic onycholytic nail with subungual debris c/w onychomycosis     Umbilicated papule    Erythematous-base heme-crusted tan verrucoid plaque consistent with inflamed seborrheic keratosis     Erythematous Silvery Scaling Plaque c/w Psoriasis     See annotation    No images are attached to the encounter or orders placed in the encounter.      [] Data reviewed  [] Prior external notes reviewed  [] Independent review of test  [] Management discussed with another provider  [] Independent historian    Assessment / Plan:        Multiple benign melanocytic nevi of upper extremity, lower extremity, and trunk  Multiple benign-appearing nevi present on exam today. Reassurance provided. Counseled patient to periodically examine moles and return to clinic if any changes in size, shape, or color are noted or if it becomes symptomatic (bleeding, itching, pain, etc).  Recommend using a broad-spectrum, water-resistant sunscreen with SPF of 30 or higher--reapply every 2 hours. Seek shade, wear sun-protective clothing, and perform regular skin self-exams.    Lentigines  These are benign sun  spots which should be monitored for changes. Daily sun protection will reduce the number of new lesions.   Recommend using a broad-spectrum, water-resistant sunscreen with SPF of 30 or higher--reapply every 2 hours. Seek shade, wear sun-protective clothing, and perform regular skin self-exams.    Seborrheic keratosis  These are benign, inherited growths without a malignant potential. Reassurance given to patient. No treatment is necessary.    History of nonmelanoma skin cancer   - stable and chronic  Area(s) of previous nonmelanoma skin cancer evaluated with no evidence of recurrence. Reassurance provided.  Recommend using a broad-spectrum, water-resistant sunscreen with SPF of 30 or higher--reapply every 2 hours. Seek shade, wear sun-protective clothing, and perform regular skin self-exams.    Screening exam for skin cancer  Total body skin examination performed today as noted in physical exam. No lesions suspicious for malignancy were seen.  Recommend using a broad-spectrum, water-resistant sunscreen with SPF of 30 or higher--reapply every 2 hours. Seek shade, wear sun-protective clothing, and perform regular skin self-exams.    Follow up in about 1 year (around 1/31/2024) for TBSE, or sooner if any new problems or changing lesions.      Tiff Hernandez MD, FAAD  [facility]

## 2023-09-07 ENCOUNTER — PATIENT MESSAGE (OUTPATIENT)
Dept: DERMATOLOGY | Facility: CLINIC | Age: 53
End: 2023-09-07
Payer: COMMERCIAL

## 2023-09-25 ENCOUNTER — OFFICE VISIT (OUTPATIENT)
Dept: DERMATOLOGY | Facility: CLINIC | Age: 53
End: 2023-09-25
Payer: COMMERCIAL

## 2023-09-25 DIAGNOSIS — L81.4 LENTIGINES: ICD-10-CM

## 2023-09-25 DIAGNOSIS — L21.9 SEBORRHEIC DERMATITIS: ICD-10-CM

## 2023-09-25 DIAGNOSIS — Z85.828 HISTORY OF NONMELANOMA SKIN CANCER: ICD-10-CM

## 2023-09-25 DIAGNOSIS — D48.5 NEOPLASM OF UNCERTAIN BEHAVIOR OF SKIN: Primary | ICD-10-CM

## 2023-09-25 DIAGNOSIS — L57.0 AK (ACTINIC KERATOSIS): ICD-10-CM

## 2023-09-25 DIAGNOSIS — D22.70 MULTIPLE BENIGN MELANOCYTIC NEVI OF UPPER EXTREMITY, LOWER EXTREMITY, AND TRUNK: ICD-10-CM

## 2023-09-25 DIAGNOSIS — D18.01 ANGIOMA OF SKIN: ICD-10-CM

## 2023-09-25 DIAGNOSIS — L73.8 SEBACEOUS HYPERPLASIA: ICD-10-CM

## 2023-09-25 DIAGNOSIS — D22.5 MULTIPLE BENIGN MELANOCYTIC NEVI OF UPPER EXTREMITY, LOWER EXTREMITY, AND TRUNK: ICD-10-CM

## 2023-09-25 DIAGNOSIS — L82.1 SEBORRHEIC KERATOSIS: ICD-10-CM

## 2023-09-25 DIAGNOSIS — D22.60 MULTIPLE BENIGN MELANOCYTIC NEVI OF UPPER EXTREMITY, LOWER EXTREMITY, AND TRUNK: ICD-10-CM

## 2023-09-25 PROCEDURE — 99999 PR PBB SHADOW E&M-EST. PATIENT-LVL III: ICD-10-PCS | Mod: PBBFAC,,, | Performed by: DERMATOLOGY

## 2023-09-25 PROCEDURE — 11102 TANGNTL BX SKIN SINGLE LES: CPT | Mod: S$GLB,,, | Performed by: DERMATOLOGY

## 2023-09-25 PROCEDURE — 1159F MED LIST DOCD IN RCRD: CPT | Mod: CPTII,S$GLB,, | Performed by: DERMATOLOGY

## 2023-09-25 PROCEDURE — 88342 IMHCHEM/IMCYTCHM 1ST ANTB: CPT | Mod: 26,,, | Performed by: PATHOLOGY

## 2023-09-25 PROCEDURE — 17000 PR DESTRUCTION(LASER SURGERY,CRYOSURGERY,CHEMOSURGERY),PREMALIGNANT LESIONS,FIRST LESION: ICD-10-PCS | Mod: XS,S$GLB,, | Performed by: DERMATOLOGY

## 2023-09-25 PROCEDURE — 88305 TISSUE EXAM BY PATHOLOGIST: ICD-10-PCS | Mod: 26,,, | Performed by: PATHOLOGY

## 2023-09-25 PROCEDURE — 88342 IMHCHEM/IMCYTCHM 1ST ANTB: CPT | Performed by: PATHOLOGY

## 2023-09-25 PROCEDURE — 99999 PR PBB SHADOW E&M-EST. PATIENT-LVL III: CPT | Mod: PBBFAC,,, | Performed by: DERMATOLOGY

## 2023-09-25 PROCEDURE — 99214 PR OFFICE/OUTPT VISIT, EST, LEVL IV, 30-39 MIN: ICD-10-PCS | Mod: 25,S$GLB,, | Performed by: DERMATOLOGY

## 2023-09-25 PROCEDURE — 99214 OFFICE O/P EST MOD 30 MIN: CPT | Mod: 25,S$GLB,, | Performed by: DERMATOLOGY

## 2023-09-25 PROCEDURE — 88305 TISSUE EXAM BY PATHOLOGIST: CPT | Performed by: PATHOLOGY

## 2023-09-25 PROCEDURE — 17003 DESTRUCTION, PREMALIGNANT LESIONS; SECOND THROUGH 14 LESIONS: ICD-10-PCS | Mod: S$GLB,,, | Performed by: DERMATOLOGY

## 2023-09-25 PROCEDURE — 1159F PR MEDICATION LIST DOCUMENTED IN MEDICAL RECORD: ICD-10-PCS | Mod: CPTII,S$GLB,, | Performed by: DERMATOLOGY

## 2023-09-25 PROCEDURE — 17000 DESTRUCT PREMALG LESION: CPT | Mod: XS,S$GLB,, | Performed by: DERMATOLOGY

## 2023-09-25 PROCEDURE — 88342 CHG IMMUNOCYTOCHEMISTRY: ICD-10-PCS | Mod: 26,,, | Performed by: PATHOLOGY

## 2023-09-25 PROCEDURE — 17003 DESTRUCT PREMALG LES 2-14: CPT | Mod: S$GLB,,, | Performed by: DERMATOLOGY

## 2023-09-25 PROCEDURE — 11102 PR TANGENTIAL BIOPSY, SKIN, SINGLE LESION: ICD-10-PCS | Mod: S$GLB,,, | Performed by: DERMATOLOGY

## 2023-09-25 PROCEDURE — 88305 TISSUE EXAM BY PATHOLOGIST: CPT | Mod: 26,,, | Performed by: PATHOLOGY

## 2023-09-25 RX ORDER — CLINDAMYCIN PHOSPHATE 11.9 MG/ML
SOLUTION TOPICAL
Qty: 30 ML | Refills: 5 | Status: SHIPPED | OUTPATIENT
Start: 2023-09-25

## 2023-09-25 RX ORDER — KETOCONAZOLE 20 MG/ML
SHAMPOO, SUSPENSION TOPICAL
Qty: 120 ML | Refills: 5 | Status: SHIPPED | OUTPATIENT
Start: 2023-09-25

## 2023-09-25 NOTE — PROGRESS NOTES
Subjective:      Patient ID:  Jake Wallace is a 52 y.o. male who presents for   Chief Complaint   Patient presents with    Skin Check     UBSE      Patient here for UBSE Body Skin Exam    Last seen by dermatologist: 01/31/2023    yes - personal history of atypical moles removed  none - personal history of MM   none - family history of MM  yes - childhood blistering sunburns  none - tanning bed use   yes - personal history of NMSC (BCC)    Patient with specific complaint of lesion(s)  Location: R- eye brow   Duration: since last visit   Symptoms: 3 weeks ago bled, resolved   Relieving factors/Previous treatments: none             Review of Systems   Skin:  Positive for activity-related sunscreen use and wears hat. Negative for daily sunscreen use and recent sunburn.   Hematologic/Lymphatic: Does not bruise/bleed easily.       Objective:   Physical Exam   Constitutional: He appears well-developed and well-nourished. No distress.   HENT:   Mouth/Throat: Lips normal.    Eyes: Lids are normal.  No conjunctival no injection.   Neurological: He is alert and oriented to person, place, and time. He is not disoriented.   Psychiatric: He has a normal mood and affect.   Skin:   Areas Examined (abnormalities noted in diagram):   Scalp / Hair Palpated and Inspected  Head / Face Inspection Performed  Neck Inspection Performed  Chest / Axilla Inspection Performed  Abdomen Inspection Performed  Genitals / Buttocks / Groin Inspection Performed  Back Inspection Performed  RUE Inspected  LUE Inspection Performed  Nails and Digits Inspection Performed                 Diagram Legend     Erythematous scaling macule/papule c/w actinic keratosis       Vascular papule c/w angioma      Pigmented verrucoid papule/plaque c/w seborrheic keratosis      Yellow umbilicated papule c/w sebaceous hyperplasia      Irregularly shaped tan macule c/w lentigo     1-2 mm smooth white papules consistent with Milia      Movable subcutaneous cyst with  punctum c/w epidermal inclusion cyst      Subcutaneous movable cyst c/w pilar cyst      Firm pink to brown papule c/w dermatofibroma      Pedunculated fleshy papule(s) c/w skin tag(s)      Evenly pigmented macule c/w junctional nevus     Mildly variegated pigmented, slightly irregular-bordered macule c/w mildly atypical nevus      Flesh colored to evenly pigmented papule c/w intradermal nevus       Pink pearly papule/plaque c/w basal cell carcinoma      Erythematous hyperkeratotic cursted plaque c/w SCC      Surgical scar with no sign of skin cancer recurrence      Open and closed comedones      Inflammatory papules and pustules      Verrucoid papule consistent consistent with wart     Erythematous eczematous patches and plaques     Dystrophic onycholytic nail with subungual debris c/w onychomycosis     Umbilicated papule    Erythematous-base heme-crusted tan verrucoid plaque consistent with inflamed seborrheic keratosis     Erythematous Silvery Scaling Plaque c/w Psoriasis     See annotation            Assessment / Plan:      Pathology Orders:       Normal Orders This Visit    Specimen to Pathology, Dermatology     Questions:    Procedure Type: Dermatology and skin neoplasms    Number of Specimens: 1    ------------------------: -------------------------    Spec 1 Procedure: Biopsy    Spec 1 Clinical Impression: r/o ak with follicle involevment vs keratosis (showed pic of possible sk that fell off)- scaly papule on superior eyebrow    Spec 1 Source: right superior eyebrow    Release to patient: Immediate          Neoplasm of uncertain behavior of skin  -     Specimen to Pathology, Dermatology  Shave biopsy procedure note:    Shave biopsy performed after verbal consent including risk of infection, scar, recurrence, need for additional treatment of site. Area prepped with alcohol, anesthetized with approximately 1.0cc of 1% lidocaine with epinephrine. Lesional tissue shaved with razor blade. Hemostasis achieved with  application of aluminum chloride followed by hyfrecation. No complications. Dressing applied. Wound care explained.    AK (actinic keratosis)  Cryosurgery Procedure Note    Verbal consent from the patient is obtained including, but not limited to, risk of hypopigmentation/hyperpigmentation, scar, recurrence of lesion. The patient is aware of the precancerous quality and need for treatment of these lesions. Liquid nitrogen cryosurgery is applied to the 2 actinic keratoses, as detailed in the physical exam, to produce a freeze injury. The patient is aware that blisters may form and is instructed on wound care with gentle cleansing and use of vaseline ointment to keep moist until healed. The patient is supplied a handout on cryosurgery and is instructed to call if lesions do not completely resolve.    Seborrheic dermatitis  -     ketoconazole (NIZORAL) 2 % shampoo; Wash hair with medicated shampoo at least 2x/week - let sit on scalp at least 5 minutes prior to rinsing  Dispense: 120 mL; Refill: 5  -     clindamycin (CLEOCIN T) 1 % external solution; Aaa bumps of scalp or inflammed follicles when flaring  Dispense: 30 mL; Refill: 5  Recommend wash face daily with SebaMed or Vanicream Z-bar. You may find these in local drugstores or search online.    Recommend OTC medicated shampoo containing active ingredients of either selenium sulfide, zinc pyrithione, tar, salicylic acid, or ketoconazole. Patient should rotate the active ingredients to avoid building tolerance. It is recommended that patient wash hair at least 2 times per week and allow shampoo to sit on scalp at least 5 minutes prior to rinsing.    Lentigines  This is a benign hyperpigmented sun induced lesion. Recommend daily sun protection/avoidance and use of at least SPF 30, broad spectrum sunscreen (OTC drug) will reduce the number of new lesions. Treatment of these benign lesions are considered cosmetic.    Angioma of skin  This is a benign vascular lesion.  Reassurance given. No treatment required.     Seborrheic keratosis  These are benign inherited growths without a malignant potential. Reassurance given to patient. No treatment is necessary.     Sebaceous hyperplasia  This is a common condition representing benign enlargement of the sebaceous lobule. It typically occurs in adulthood. Reassurance given to patient.     Multiple benign melanocytic nevi of upper extremity, lower extremity, and trunk  UBSE body skin examination performed today including at least 9 points as noted in physical examination. No lesions suspicious for malignancy noted.  Reassurance provided.    Instructed patient to observe lesion(s) for changes and follow up in clinic if changes are noted. Patient to monitor skin at home for new or changing lesions and follow up in clinic if noted.    Discussed ABCDE's of moles and brochure provided.    History of nonmelanoma skin cancer  Previous scar examined without signs of reoccurrence of malignancy. Continue to monitor.              Follow up in about 1 year (around 9/25/2024) for for TBSE.

## 2023-09-25 NOTE — PATIENT INSTRUCTIONS
Shave Biopsy Wound Care    Your doctor has performed a shave biopsy today.  A band aid and vaseline ointment has been placed over the site.  This should remain in place for NO LONGER THAN 48 hours.  It is fine to remove the bandaid after 24 hours, if the area is no longer bleeding. It is recommended that you keep the area dry (do not wet)) for the first 24 hours.  After 24 hours, wash the area with warm soap and water and apply Vaseline jelly.  Many patients prefer to use Neosporin or Bacitracin ointment.  This is acceptable; however, know that you can develop an allergy to this medication even if you have used it safely for years.  It is important to keep the area moist.  Letting it dry out and get air slows healing time, and will worsen the scar.        If you notice increasing redness, tenderness, pain, or yellow drainage at the biopsy site, please notify your doctor.  These are signs of an infection.    If your biopsy site is bleeding, apply firm pressure for 15 minutes straight.  Repeat for another 15 minutes, if it is still bleeding.   If the surgical site continues to bleed, then please contact your doctor.      For [facility] users:   You will receive your biopsy results in [facility] as soon as they are available. Please be assured that your physician/provider will review your results and will then determine what further treatment, evaluation, or planning is required. You should be contacted by your physician's/provider's office within 5 business days of receiving your results; If not, please reach out to directly. This is one more way [facility] is putting you first.     [facility] [place] [address]/ [phone] FAX/ [email or web address]

## 2023-09-29 ENCOUNTER — PATIENT MESSAGE (OUTPATIENT)
Dept: DERMATOLOGY | Facility: CLINIC | Age: 53
End: 2023-09-29
Payer: COMMERCIAL

## 2023-10-02 LAB
FINAL PATHOLOGIC DIAGNOSIS: NORMAL
GROSS: NORMAL
Lab: NORMAL
MICROSCOPIC EXAM: NORMAL

## 2023-10-04 DIAGNOSIS — M25.512 LEFT SHOULDER PAIN, UNSPECIFIED CHRONICITY: ICD-10-CM

## 2023-10-04 DIAGNOSIS — M25.522 LEFT ELBOW PAIN: Primary | ICD-10-CM

## 2023-10-05 ENCOUNTER — HOSPITAL ENCOUNTER (OUTPATIENT)
Dept: RADIOLOGY | Facility: HOSPITAL | Age: 53
Discharge: HOME | End: 2023-10-05
Attending: PHYSICIAN ASSISTANT
Payer: COMMERCIAL

## 2023-10-05 ENCOUNTER — OFFICE VISIT (OUTPATIENT)
Dept: SPORTS MEDICINE | Facility: CLINIC | Age: 53
End: 2023-10-05
Payer: COMMERCIAL

## 2023-10-05 VITALS
HEIGHT: 73 IN | SYSTOLIC BLOOD PRESSURE: 126 MMHG | WEIGHT: 205 LBS | DIASTOLIC BLOOD PRESSURE: 75 MMHG | HEART RATE: 55 BPM | BODY MASS INDEX: 27.17 KG/M2

## 2023-10-05 DIAGNOSIS — M25.522 LEFT ELBOW PAIN: ICD-10-CM

## 2023-10-05 DIAGNOSIS — M77.8 TRICEPS TENDONITIS: ICD-10-CM

## 2023-10-05 DIAGNOSIS — M25.812 IMPINGEMENT OF LEFT SHOULDER: ICD-10-CM

## 2023-10-05 DIAGNOSIS — G57.02 PIRIFORMIS SYNDROME, LEFT: ICD-10-CM

## 2023-10-05 DIAGNOSIS — S76.012A STRAIN OF GLUTEUS MEDIUS OF LEFT LOWER EXTREMITY, INITIAL ENCOUNTER: ICD-10-CM

## 2023-10-05 DIAGNOSIS — M25.512 LEFT SHOULDER PAIN, UNSPECIFIED CHRONICITY: ICD-10-CM

## 2023-10-05 DIAGNOSIS — M77.12 LEFT LATERAL EPICONDYLITIS: Primary | ICD-10-CM

## 2023-10-05 PROCEDURE — 99999 PR PBB SHADOW E&M-EST. PATIENT-LVL IV: ICD-10-PCS | Mod: PBBFAC,,, | Performed by: PHYSICIAN ASSISTANT

## 2023-10-05 PROCEDURE — 3008F BODY MASS INDEX DOCD: CPT | Mod: CPTII,S$GLB,, | Performed by: PHYSICIAN ASSISTANT

## 2023-10-05 PROCEDURE — 73030 X-RAY EXAM OF SHOULDER: CPT | Mod: 26,LT,, | Performed by: RADIOLOGY

## 2023-10-05 PROCEDURE — 99215 OFFICE O/P EST HI 40 MIN: CPT | Mod: S$GLB,,, | Performed by: PHYSICIAN ASSISTANT

## 2023-10-05 PROCEDURE — 3074F SYST BP LT 130 MM HG: CPT | Mod: CPTII,S$GLB,, | Performed by: PHYSICIAN ASSISTANT

## 2023-10-05 PROCEDURE — 1159F PR MEDICATION LIST DOCUMENTED IN MEDICAL RECORD: ICD-10-PCS | Mod: CPTII,S$GLB,, | Performed by: PHYSICIAN ASSISTANT

## 2023-10-05 PROCEDURE — 73030 X-RAY EXAM OF SHOULDER: CPT | Mod: TC,PN,LT

## 2023-10-05 PROCEDURE — 3078F DIAST BP <80 MM HG: CPT | Mod: CPTII,S$GLB,, | Performed by: PHYSICIAN ASSISTANT

## 2023-10-05 PROCEDURE — 1160F PR REVIEW ALL MEDS BY PRESCRIBER/CLIN PHARMACIST DOCUMENTED: ICD-10-PCS | Mod: CPTII,S$GLB,, | Performed by: PHYSICIAN ASSISTANT

## 2023-10-05 PROCEDURE — 73080 X-RAY EXAM OF ELBOW: CPT | Mod: 26,LT,, | Performed by: RADIOLOGY

## 2023-10-05 PROCEDURE — 1159F MED LIST DOCD IN RCRD: CPT | Mod: CPTII,S$GLB,, | Performed by: PHYSICIAN ASSISTANT

## 2023-10-05 PROCEDURE — 3008F PR BODY MASS INDEX (BMI) DOCUMENTED: ICD-10-PCS | Mod: CPTII,S$GLB,, | Performed by: PHYSICIAN ASSISTANT

## 2023-10-05 PROCEDURE — 3078F PR MOST RECENT DIASTOLIC BLOOD PRESSURE < 80 MM HG: ICD-10-PCS | Mod: CPTII,S$GLB,, | Performed by: PHYSICIAN ASSISTANT

## 2023-10-05 PROCEDURE — 1160F RVW MEDS BY RX/DR IN RCRD: CPT | Mod: CPTII,S$GLB,, | Performed by: PHYSICIAN ASSISTANT

## 2023-10-05 PROCEDURE — 73080 XR ELBOW COMPLETE 3 VIEW LEFT: ICD-10-PCS | Mod: 26,LT,, | Performed by: RADIOLOGY

## 2023-10-05 PROCEDURE — 73030 XR SHOULDER COMPLETE 2 OR MORE VIEWS LEFT: ICD-10-PCS | Mod: 26,LT,, | Performed by: RADIOLOGY

## 2023-10-05 PROCEDURE — 3074F PR MOST RECENT SYSTOLIC BLOOD PRESSURE < 130 MM HG: ICD-10-PCS | Mod: CPTII,S$GLB,, | Performed by: PHYSICIAN ASSISTANT

## 2023-10-05 PROCEDURE — 73080 X-RAY EXAM OF ELBOW: CPT | Mod: TC,PN,LT

## 2023-10-05 PROCEDURE — 99999 PR PBB SHADOW E&M-EST. PATIENT-LVL IV: CPT | Mod: PBBFAC,,, | Performed by: PHYSICIAN ASSISTANT

## 2023-10-05 PROCEDURE — 99215 PR OFFICE/OUTPT VISIT, EST, LEVL V, 40-54 MIN: ICD-10-PCS | Mod: S$GLB,,, | Performed by: PHYSICIAN ASSISTANT

## 2023-10-05 RX ORDER — APIXABAN 5 MG/1
5 TABLET, FILM COATED ORAL 2 TIMES DAILY
COMMUNITY
Start: 2023-09-27

## 2023-10-05 NOTE — PROGRESS NOTES
Subjective:     Chief Complaint: Jake Wallace is a 52 y.o. male who had concerns including Pain of the Left Shoulder and Pain of the Left Elbow.    Patient presents to clinic with left shoulder pain, left elbow pain, and left gluteus pain.    Left shoulder pain has been present for approximately 6 months. Denies a specific ELVIS. He states that he sleeps with his arms over his head. He reports waking up and noticing the pain in his shoulder. Pain at rest is 1/10. Pain at its worst is 4/10. Dnies any previous history of injections or surgeries to his shoulder. Denies any loss in ROM or strength in his left shoulder. He plays tennis twice weekly and notices increased pain with his overhand serve. He is LHD.    Left elbow pain began approximately 4 weeks ago. No new injury. He has had left tennis elbow in the past and treated conservatively with formal PT and Tennis elbow strap. He has been working on his exercises again but notes a new pain in the posterior aspect of his left elbow. Pain increases with playing tennis and increased activity.  He has been using voltaren gel over elbow as needed.    Left gluteus pain began one week ago when swinging his left leg to get out of his car. Pain is located in his left buttocks. Previous history of bilateral lateral hip pain with prolonged walking or sitting, but reports that this pain is different.     He is here today to discuss treatment options.         Review of Systems   Constitutional: Negative. Negative for chills, fever, weight gain and weight loss.   HENT:  Negative for congestion and sore throat.    Eyes:  Negative for blurred vision and double vision.   Cardiovascular:  Negative for chest pain, leg swelling and palpitations.   Respiratory:  Negative for cough and shortness of breath.    Hematologic/Lymphatic: Does not bruise/bleed easily.   Skin:  Negative for itching, poor wound healing and rash.   Musculoskeletal:  Positive for joint pain and muscle weakness.  Negative for back pain, joint swelling, myalgias and stiffness.   Gastrointestinal:  Negative for abdominal pain, constipation, diarrhea, nausea and vomiting.   Genitourinary: Negative.  Negative for frequency and hematuria.   Neurological:  Negative for dizziness, headaches, numbness, paresthesias and sensory change.   Psychiatric/Behavioral:  Negative for altered mental status and depression. The patient is not nervous/anxious.    Allergic/Immunologic: Negative for hives.                 Objective:     General: Jake is well-developed, well-nourished, appears stated age, in no acute distress, alert and oriented to time, place and person.     General    Vitals reviewed.  Constitutional: He is oriented to person, place, and time. He appears well-developed and well-nourished. No distress.   HENT:   Head: Normocephalic and atraumatic.   Eyes: EOM are normal.   Cardiovascular:  Normal rate and regular rhythm.            Pulmonary/Chest: Effort normal. No respiratory distress.   Neurological: He is alert and oriented to person, place, and time. He has normal reflexes. No cranial nerve deficit. Coordination normal.   Psychiatric: He has a normal mood and affect. His behavior is normal. Judgment and thought content normal.     General Musculoskeletal Exam   Gait: normal   Pelvic Obliquity: none      Right Knee Exam     Inspection   Alignment:  normal  Effusion: absent    Left Knee Exam     Inspection   Alignment:  normal  Effusion: absent    Right Hip Exam     Inspection   Scars: absent  Swelling: absent  Bruising: absent  No deformity of hip.  Quadriceps Atrophy:  Negative  Erythema: absent    Range of Motion   Extension:  0 normal   Flexion:  120 normal   Left Hip Exam     Inspection   Scars: absent  Swelling: absent  No deformity of hip.  Quadriceps Atrophy:  negative  Erythema: absent  Bruising: absent    Tenderness   The patient tender to palpation of the piriformis.    Range of Motion   The patient has normal left  hip ROM.  Abduction:  40 normal   Adduction:  20 normal   Extension:  0 normal   Flexion:  120 normal   External rotation:  70 normal   Internal rotation: 30 normal     Muscle Strength   The patient has normal left hip strength.     Tests   Pain w/ forced internal rotation (GARY): absent  Pain w/ forced external rotation (FADIR): absent  Trendelenburg Test: negative    Other   Sensation: normal    Comments:  Tenderness over proximal hamstring insertion        Back (L-Spine & T-Spine) / Neck (C-Spine) Exam   Back exam is normal.      Right Hand/Wrist Exam     Inspection   Scars: Wrist - absent   Effusion: Wrist - absent   Bruising: Wrist - absent   Deformity: Wrist - deformity     Range of Motion     Wrist   Extension:  50 normal   Flexion:  90 normal   Abduction: 25 normal  Adduction: 40 normal    Other     Neuorologic Exam    Median Distribution: normal  Ulnar Distribution: normal  Radial Distribution: normal      Left Hand/Wrist Exam     Inspection   Scars: Wrist - absent   Effusion: Wrist - absent   Bruising: Wrist - absent   Deformity: Wrist - absent     Range of Motion     Wrist   Extension:  50 normal   Flexion:  90 normal   Abduction: 25 normal  Adduction: 40 normal    Other     Sensory Exam  Median Distribution: normal  Ulnar Distribution: normal  Radial Distribution: normal      Right Elbow Exam     Inspection   Scars: absent  Effusion: absent  Bruising: absent  Deformity: absent  Atrophy: absent    Range of Motion   Extension:  0 normal   Flexion:  140 normal   Pronation:  normal   Supination:  normal     Tests   Varus: negative  Valgus: negative  Tinel's sign (cubital tunnel): negative  Tennis Elbow: negative  Golfer's Elbow: negative  Radial Capitellar Grind: negative    Other   Sensation: normal      Left Elbow Exam     Inspection   Scars: absent  Effusion: absent  Bruising: absent  Deformity: absent  Atrophy: absent    Pain   The patient exhibits pain of the lateral epicondyle and triceps  insertion    Swelling   The patient is swollen on the lateral epicondyle    Tenderness   The patient is tender to palpation of the lateral epicondyle.     Range of Motion   Extension:  0 normal   Flexion:  140 normal   Pronation:  normal   Supination:  abnormal Left elbow supination: pain.    Tests   Varus: negative  Valgus: negative  Tinel's sign (cubital tunnel): negative  Tennis Elbow: moderate  Golfer's Elbow: negative  Radial Capitellar Grind: negative    Other   Sensation: normal    Right Shoulder Exam     Inspection/Observation   Swelling: absent  Bruising: absent  Scars: absent  Deformity: absent  Scapular Winging: absent  Scapular Dyskinesia: negative  Atrophy: absent    Tenderness   The patient is experiencing no tenderness.    Range of Motion   Active abduction:  160 normal   Passive abduction:  160 normal   Extension:  50 normal   Forward Flexion:  180 normal   Adduction: 60 normal  External Rotation 0 degrees:  70 normal   External Rotation 90 degrees: 90 normal  Internal rotation 0 degrees:  T4 normal   Internal rotation 90 degrees:  80 normal     Muscle Strength   The patient has normal right shoulder strength.    Tests & Signs   Apprehension: negative  Cross arm: negative  Drop arm: negative  Huizar test: negative  Impingement: negative  Sulcus: absent  Lift Off Sign: negative  Active Compression Test (Brookings's Sign): negative  Yergason's Test: negative  Speed's Test: negative  Anterior Drawer Test: 1+   Posterior Drawer Test: 1+    Other   Sensation: normal    Left Shoulder Exam     Inspection/Observation   Swelling: absent  Bruising: absent  Scars: absent  Deformity: absent  Scapular Winging: absent  Scapular Dyskinesia: negative  Atrophy: absent    Tenderness   The patient is experiencing no tenderness.     Range of Motion   Active abduction:  160 normal   Passive abduction:  160 normal   Extension:  50 normal   Forward Flexion:  180 normal   Adduction: 60 normal  External Rotation 0 degrees:   70 (pain) normal   External Rotation 90 degrees: 90 normal  Internal rotation 0 degrees:  T4 normal   Internal rotation 90 degrees:  80 normal     Muscle Strength   The patient has normal left shoulder strength.    Tests & Signs   Apprehension: positive  Cross arm: positive  Drop arm: negative  Huizar test: positive  Impingement: negative  Sulcus: absent  Lift Off Sign: negative  Active Compression test (Robinson's Sign): negative  Yergasons's Test: negative  Speed's Test: negative  Anterior Drawer Test: 1+  Posterior Drawer Test: 1+    Other   Sensation: normal       Muscle Strength   Right Upper Extremity   Shoulder Abduction: 5/5   Shoulder Internal Rotation: 5/5   Shoulder External Rotation: 5/5   Supraspinatus: 5/5   Subscapularis: 5/5   Biceps: 5/5   Wrist extension: 5/5   Wrist flexion: 5/5   : 5/5   Pinch Mechanism: 5/5  Elbow Pronation:  5/5   Elbow Supination:  5/5   Elbow Extension: 5/5  Elbow Flexion: 5/5  Left Upper Extremity  Shoulder Abduction: 5/5   Shoulder Internal Rotation: 5/5   Shoulder External Rotation: 5/5   Supraspinatus: 5/5   Subscapularis: 5/5   Biceps: 5/5   Wrist extension: 4/5 (pain)   Wrist flexion: 5/5   :  5/5   Pinch Mechanism: 5/5  Elbow Pronation:  5/5   Elbow Supination:  4/5 (pain)   Elbow Extension: 4/5 (pain)  Elbow Flexion: 5/5  Left Lower Extremity   Hip Abduction: 5/5   Hip Adduction: 5/5   Hip Flexion: 5/5     Reflexes     Left Side  Biceps:  2+  Triceps:  2+  Brachioradialis:  2+  Achilles:  2+  Quadriceps:  2+    Right Side   Biceps:  2+  Triceps:  2+  Brachioradialis:  2+    RADIOGRAPHS: 10/5/23  Left shoulder:  FINDINGS:  Three views left shoulder.     The left humeral head maintains appropriate relationship with the glenoid.  The acromial clavicular joint is intact noting degenerative change.  No acute displaced left rib fracture.  The left lung zones are clear.    Left elbow:  FINDINGS:  Three views left elbow.     No significant displacement of the anterior  or posterior elbow fat pads.  The anterior humeral line and radiocapitellar line are in appropriate orientation.  No acute displaced fracture or dislocation of the elbow.  No radiopaque foreign body.  No significant edema.  There are degenerative changes of the lateral humeral epicondyle.      Assessment:     Encounter Diagnoses   Name Primary?    Left lateral epicondylitis Yes    Triceps tendonitis     Impingement of left shoulder     Piriformis syndrome, left     Strain of gluteus medius of left lower extremity, initial encounter         Plan:     We have discussed a variety of treatment options including medications, injections, physical therapy and other alternative treatments. I also explained the indications, risks and benefits of surgery. Given the patients hx and examination today, I believe he would benefit from physical therapy for his left elbow and left shoulder. Pt agrees and would like to proceed with formal physical therapy.    I made the decision to obtain old records of the patient including previous notes and imaging. I independently reviewed and interpreted lab results today as well as prior imaging. Reviewed with patient in detail.    1. OTC NSAIDs as needed.  2. Ice compress to the affected area 2-3x a day for 15-20 minutes as needed for pain management.  3. Ambulatory referral to physical therapy for periscapular/rotator cuff strengthening and left tennis elbow/triceps exercises. Ronaldo protocol for scapular dyskinesis. [facility]  4. Consider CSI in left shoulder if symptoms worsen.  Possible MRI of left elbow and/or left shoulder if not relieved by formal PT  5. Tennis elbow stretches and hamstring/piriformis stretches shown to patient today.  6. The total face-to-face encounter time with this patient was 45 minutes and greater than 50% of of the encounter time was spent counseling the patient and coordinating care. Significant time was also spent educating the patient, giving detail  post-visit instructions, and discussing risks and benefits of treatment. Patient also had several specific questions that were answered during the visit today.   7. RTC to see Elle Soni PA-C in 8 weeks for follow-up if continued pain    All of the patient's questions were answered and the patient will contact us if they have any questions or concerns in the interim.        Patient questionnaires may have been collected.

## 2023-10-20 ENCOUNTER — CLINICAL SUPPORT (OUTPATIENT)
Dept: REHABILITATION | Facility: HOSPITAL | Age: 53
End: 2023-10-20
Payer: COMMERCIAL

## 2023-10-20 DIAGNOSIS — M77.12 LEFT LATERAL EPICONDYLITIS: ICD-10-CM

## 2023-10-20 DIAGNOSIS — M25.522 LEFT ELBOW PAIN: ICD-10-CM

## 2023-10-20 DIAGNOSIS — M25.512 CHRONIC LEFT SHOULDER PAIN: ICD-10-CM

## 2023-10-20 DIAGNOSIS — M77.8 TRICEPS TENDONITIS: ICD-10-CM

## 2023-10-20 DIAGNOSIS — G89.29 CHRONIC LEFT SHOULDER PAIN: ICD-10-CM

## 2023-10-20 DIAGNOSIS — M25.812 IMPINGEMENT OF LEFT SHOULDER: ICD-10-CM

## 2023-10-20 PROCEDURE — 97161 PT EVAL LOW COMPLEX 20 MIN: CPT

## 2023-10-20 PROCEDURE — 97112 NEUROMUSCULAR REEDUCATION: CPT

## 2023-10-20 PROCEDURE — 97140 MANUAL THERAPY 1/> REGIONS: CPT

## 2023-10-26 PROBLEM — G89.29 CHRONIC LEFT SHOULDER PAIN: Status: ACTIVE | Noted: 2023-10-26

## 2023-10-26 PROBLEM — M25.522 LEFT ELBOW PAIN: Status: ACTIVE | Noted: 2023-10-26

## 2023-10-26 PROBLEM — M25.512 CHRONIC LEFT SHOULDER PAIN: Status: ACTIVE | Noted: 2023-10-26

## 2023-10-27 ENCOUNTER — CLINICAL SUPPORT (OUTPATIENT)
Dept: REHABILITATION | Facility: HOSPITAL | Age: 53
End: 2023-10-27
Payer: COMMERCIAL

## 2023-10-27 DIAGNOSIS — M25.522 LEFT ELBOW PAIN: Primary | ICD-10-CM

## 2023-10-27 DIAGNOSIS — G89.29 CHRONIC LEFT SHOULDER PAIN: ICD-10-CM

## 2023-10-27 DIAGNOSIS — M25.512 CHRONIC LEFT SHOULDER PAIN: ICD-10-CM

## 2023-10-27 PROCEDURE — 97112 NEUROMUSCULAR REEDUCATION: CPT

## 2023-10-27 PROCEDURE — 97140 MANUAL THERAPY 1/> REGIONS: CPT

## 2023-10-27 NOTE — PLAN OF CARE
[facility]   Physical Therapy Initial Evaluation      Name: Jake Pradoreys  Clinic Number: [id number]    Therapy Diagnosis: No diagnosis found.     Physician: Elle Soni PA-C    Physician Orders: PT Eval and Treat   Medical Diagnosis from Referral: Left lateral epicondylitis [M77.12], Triceps tendonitis [M77.8], Impingement of left shoulder [M25.812]  Evaluation Date: 10/20/2023  Authorization Period Expiration: 10/4/24  Plan of Care Expiration: 1/31/24  Progress Note Due: 11/20/23  Date of Surgery: n/a  Visit # / Visits authorized: 1/ 1   FOTO: 1/ 3    Precautions: Standard     Time In: 8:00  Time Out: 9:00  Total Billable Time: 60 minutes    Subjective     Date of onset: a couple months    History of current condition - Jake reports:  left elbow pain and shoulder pain that has been going on for months. Had previous left elbow pain that has been fluctuating for years. Left shoulder pain really started about 6 months ago. Feels like it gets worse with playing tennis and depending on sleeping position. Denies having numbness or tingling. Went and played tennis the other day and took medicine and ice the next morning so symptoms were not as bad. Plays tennis 2x a week and feels okay during but worse following.     Imaging: x-ray - Impression:  1. No acute displaced fracture or dislocation of the left elbow.    Prior Therapy: for left elbow  Social History: tennis 2x a week  Occupation: computer job  Prior Level of Function: independent with activities, elbow pain with tennis  Current Level of Function: pain with shoulder and elbow motion, tennis, gripping    Pain:  Current 3/10, worst 8/10, best 0/10   Location: left elbow  and shoulder   Description: Aching, Dull, and Sharp  Aggravating Factors: Extension, Flexing, and tennis  Easing Factors: pain medication, ice, and rest    Patients goals: return to full tennis activities, pain free elbow motion, and lifting/ADL's      Medical History:   Past Medical History:   Diagnosis Date    Atrial fibrillation     Basal cell carcinoma     Generalized anxiety disorder     Hypercholesteremia     Hypogonadism in male     Vitamin D deficiency        Surgical History:   Jake Wallace  has a past surgical history that includes Colonoscopy (N/A, 7/15/2020).    Medications:   Jake has a current medication list which includes the following prescription(s): clindamycin, eliquis, ketoconazole, lorazepam, metoprolol tartrate, testosterone, and valacyclovir.    Allergies:   Review of patient's allergies indicates:   Allergen Reactions    Penicillins         Objective      active Range of Motion:   Elbow  Left Right   Flexion 140* posterior 150   Extension 0 * 0   Supination 75 85   Pronation 70 75    *indicates pain    Special Tests:  Cozen: positive on left   Mills: positive on left   Octavio: positive on left       Observation: increased edema along left lateral elbow. Slight excess upward rotation and UT activation on left.     Posture: slightly decreased thoracic kyphosis.    Passive Range of Motion:   Shoulder Right Left   Flexion 180 180   Abduction 180 180*   ER at 0 50 50   ER at 90 95 105*   IR 60 65      Active Range of Motion:   Shoulder Right Left   Flexion 175 175   Abduction 175 175   ER at 0 50 50   ER at 90 90 90*   IR nt nt   Reach behind head nt nt   Reach behind back  nt nt     Strength:  Shoulder Right Left   Flexion 4/5 4-/5   Abduction 4/5 4-/5   ER 4/5 3+/5   IR 4/5 4/5   Serratus Anterior nt nt   Middle Trap 3+/5 3-/5   Low Trap 3+/5 3-/5       Special Tests:  Impingement Cluster:   Right Left   Hawkin's Kenndy negative positive   Painful Arc negative negative   Resisted ER negative negative     Rotator Cuff Tear   Right Left   Painful Arc     Drop Arm test negative negative   Resisted ER       Instability:   Right Left   Anterior Apprehension Test negative Posterior pain   Relocation test negative Positive for  "reduction in pain   Posterior Apprehension Test nt nt   Sulcus Sign nt nt       Joint Mobility: hypermobile on left shoulder, decreased lateral glide on left elbow, decreased PRUJ.     Palpation: TTP lateral elbow    Intake Outcome Measure for FOTO elbow Survey    Therapist reviewed FOTO scores for Jake Wallace on 10/20/2023.   FOTO report - see Media section or FOTO account episode details.    Intake Score: see media         Treatment     Total Treatment time (time-based codes) separate from Evaluation: 20 minutes     Jake received the treatments listed below:      manual therapy techniques: Joint mobilizations were applied to the: shoulder/elbow for 10 minutes, including:  Shoulder rythmic stabilizations  Lateral elbow glide  PRUJ mobilizations    neuromuscular re-education activities to improve: Proprioception, Posture, and motor control for 10 minutes. The following activities were included:  Shoulder isometrics 3x45"  - shoulder ER during 2 minute rest break    Wrist eccentrics 5x 4#  Pt education  HEP    Patient Education and Home Exercises     Education provided:   - HEP  - isometrics and tendon loading  - holding on tennis    Written Home Exercises Provided: yes. Exercises were reviewed and Jake was able to demonstrate them prior to the end of the session.  Jake demonstrated good  understanding of the education provided. See EMR under Patient Instructions for exercises provided during therapy sessions.    Assessment     Jake is a 52 y.o. male referred to outpatient Physical Therapy with a medical diagnosis of Left lateral epicondylitis [M77.12], Triceps tendonitis [M77.8], Impingement of left shoulder [M25.812]. Patient presents with decreased elbow range of motion, decreased UE strength, abnormal posture, and pain affecting everyday activities.     Patient prognosis is Good.   Patient will benefit from skilled outpatient Physical Therapy to address the deficits stated above and in the " chart below, provide patient /family education, and to maximize patientt's level of independence.     Plan of care discussed with patient: Yes  Patient's spiritual, cultural and educational needs considered and patient is agreeable to the plan of care and goals as stated below:     Anticipated Barriers for therapy: scheduling, tennis    Medical Necessity is demonstrated by the following  History  Co-morbidities and personal factors that may impact the plan of care [] LOW: no personal factors / co-morbidities  [x] MODERATE: 1-2 personal factors / co-morbidities  [] HIGH: 3+ personal factors / co-morbidities    Moderate / High Support Documentation:   Co-morbidities affecting plan of care: see medical    Personal Factors:   age     Examination  Body Structures and Functions, activity limitations and participation restrictions that may impact the plan of care [] LOW: addressing 1-2 elements  [] MODERATE: 3+ elements  [x] HIGH: 4+ elements (please support below)    Moderate / High Support Documentation: range of motion, joint mobility, strength, motor control     Clinical Presentation [x] LOW: stable  [] MODERATE: Evolving  [] HIGH: Unstable     Decision Making/ Complexity Score: low       GOALS: Short Term Goals:  2-6 weeks  1.Report decreased elbow pain < / =  3/10  to increase tolerance for tennis activities  2. Increase PROM full pain free   3. Increased strength by 1/3 MMT grade in shoulder to increase tolerance for ADL and work activities.  4. Pt to tolerate HEP to improve ROM and independence with ADL's    Long Term Goals: 8-12 weeks  1.Report decreased elbow pain  < / =  0 /10  to increase tolerance for tennis activities   2.Increase AROM to full pain free  3.Increase strength to >/= 4-/5 in scapular muscles to increase tolerance for ADL and work activities.  4. Pt goal: return to full tennis activities with pain free  5. Pt will have improved gcode of CJ (20-40% limited) on FOTO shoulder in order to demonstrate  true functional improvement.    Plan     Plan of care Certification: 10/20/2023 to 12/31/23.    Outpatient Physical Therapy 1-2 times weekly for 6-12 weeks to include the following interventions: Manual Therapy, Moist Heat/ Ice, Neuromuscular Re-ed, Patient Education, Self Care, Therapeutic Activities, and Therapeutic Exercise.     Jacoby Blandon, PT        Physician's Signature: _________________________________________ Date: ________________

## 2023-11-07 NOTE — PROGRESS NOTES
"  [facility]   Physical Therapy Treatment Note      Name: Jake FARFAN Haslet  Clinic Number: [id number]    Therapy Diagnosis:   Encounter Diagnoses   Name Primary?    Left elbow pain Yes    Chronic left shoulder pain      Physician: Elle Soni PA-C    Visit Date: 10/27/2023    Physician Orders: PT Eval and Treat   Medical Diagnosis from Referral: Left lateral epicondylitis [M77.12], Triceps tendonitis [M77.8], Impingement of left shoulder [M25.812]  Evaluation Date: 10/20/2023  Authorization Period Expiration: 10/4/24  Plan of Care Expiration: 1/31/24  Progress Note Due: 11/20/23  Date of Surgery: n/a  Visit # / Visits authorized: 1/20  FOTO: 1/ 3     Precautions: Standard      Time In: 8:00  Time Out: 9:00  Total Billable Time: 60 minutes    PTA Visit #: 0/5     Subjective     Pt reports: feeling some improvement with isometrics, but getting elbow symptoms.     He was compliant with home exercise program.  Response to previous treatment: slightly improved pain  Functional change: improved elbow range    Pain: 3/10  Location: left elbow  and shoulder      Objective      Objective Measures updated at progress report unless specified.     Treatment     Jake received the treatments listed below:      therapeutic exercises to develop strength, endurance, and ROM for 0 minutes including:      manual therapy techniques: Joint mobilizations were applied to the: elbow for 10 minutes, including:  Humero-ulnar general gapping  Proximal radial head mobilizations    neuromuscular re-education activities to improve: Proprioception, Posture, and motor control for 45 minutes. The following activities were included:  Shoulder ryhtmic stabilizations increasing ER 6 rounds  Shoulder isometrics 5x45"  - shoulder ER during 2 minute rest break  Wrist eccentrics 3x10 5#  Prone mid trap level 2 3x8  Prone shoulder ext 2x8  Pt education  HEP    therapeutic activities to improve functional performance " for 0  minutes, including      Patient Education and Home Exercises       Education provided:   - HEP  - isometrics and tendon loading  - holding on tennis    Written Home Exercises Provided: YES. Exercises were reviewed and Jake was able to demonstrate them prior to the end of the session.  Jake demonstrated good understanding of the education provided. See EMR under Patient Instructions for exercises provided during therapy sessions    Assessment     Jake showed improved symptoms following manual and isometrics. We modified time for sidelying ER due to getting fatigue quickly. Shoulder still having some weakness and fatigues quickly with exercises. Added scapular strengthening and rotator cuff exercises. Is planning to play tennis this weekend and will follow up next week.     Jake Is progressing well towards his goals.   Pt prognosis is Good.     Pt will continue to benefit from skilled outpatient physical therapy to address the deficits listed in the problem list box on initial evaluation, provide pt/family education and to maximize pt's level of independence in the home and community environment.     Pt's spiritual, cultural and educational needs considered and pt agreeable to plan of care and goals.     Anticipated barriers to physical therapy: scheduling, tennis    GOALS: Short Term Goals:  2-6 weeks - progressing  1.Report decreased elbow pain < / =  3/10  to increase tolerance for tennis activities  2. Increase PROM full pain free   3. Increased strength by 1/3 MMT grade in shoulder to increase tolerance for ADL and work activities.  4. Pt to tolerate HEP to improve ROM and independence with ADL's     Long Term Goals: 8-12 weeks - progressing  1.Report decreased elbow pain  < / =  0 /10  to increase tolerance for tennis activities   2.Increase AROM to full pain free  3.Increase strength to >/= 4-/5 in scapular muscles to increase tolerance for ADL and work activities.  4. Pt goal: return to full  tennis activities with pain free  5. Pt will have improved gcode of CJ (20-40% limited) on FOTO shoulder in order to demonstrate true functional improvement.    Plan     Continue with POC    Jacoby Blandon, PT, DPT, OCS  Co-Treatment - Nixon Abarca, SPT     I certify that I was present in the room directing the student in service delivery and guiding them using my skilled judgment. As the co-signing therapist I have reviewed the students documentation and am responsible for the treatment, assessment, and plan.

## 2023-11-08 ENCOUNTER — PATIENT MESSAGE (OUTPATIENT)
Dept: DERMATOLOGY | Facility: CLINIC | Age: 53
End: 2023-11-08
Payer: COMMERCIAL

## 2023-11-10 ENCOUNTER — CLINICAL SUPPORT (OUTPATIENT)
Dept: REHABILITATION | Facility: HOSPITAL | Age: 53
End: 2023-11-10
Payer: COMMERCIAL

## 2023-11-10 DIAGNOSIS — M25.512 CHRONIC LEFT SHOULDER PAIN: ICD-10-CM

## 2023-11-10 DIAGNOSIS — M25.522 LEFT ELBOW PAIN: Primary | ICD-10-CM

## 2023-11-10 DIAGNOSIS — G89.29 CHRONIC LEFT SHOULDER PAIN: ICD-10-CM

## 2023-11-10 PROCEDURE — 97140 MANUAL THERAPY 1/> REGIONS: CPT

## 2023-11-10 PROCEDURE — 97112 NEUROMUSCULAR REEDUCATION: CPT

## 2023-11-12 NOTE — PROGRESS NOTES
[facility]   Physical Therapy Treatment Note      Name: Jake FARFAN Eden  Clinic Number: [id number]    Therapy Diagnosis:   Encounter Diagnoses   Name Primary?    Left elbow pain Yes    Chronic left shoulder pain        Physician: Elle Soni PA-C    Visit Date: 11/10/2023    Physician Orders: PT Eval and Treat   Medical Diagnosis from Referral: Left lateral epicondylitis [M77.12], Triceps tendonitis [M77.8], Impingement of left shoulder [M25.812]  Evaluation Date: 10/20/2023  Authorization Period Expiration: 10/4/24  Plan of Care Expiration: 1/31/24  Progress Note Due: 11/20/23  Date of Surgery: n/a  Visit # / Visits authorized: 2/20  FOTO: 1/ 3     Precautions: Standard      Time In: 8:00  Time Out: 9:00  Total Billable Time: 60 minutes    PTA Visit #: 0/5     Subjective     Pt reports: feeling better with lateral elbow symptoms, still some discomfort in posterior elbow. Is planning to play tennis tomorrow.     He was compliant with home exercise program.  Response to previous treatment: slightly improved pain  Functional change: improved elbow range    Pain: 3/10  Location: left elbow  and shoulder      Objective      Objective Measures updated at progress report unless specified.     Treatment     Jake received the treatments listed below:      therapeutic exercises to develop strength, endurance, and ROM for 0 minutes including:      manual therapy techniques: Joint mobilizations were applied to the: elbow for 10 minutes, including:  Humero-ulnar general gapping  Proximal radial head mobilizations    neuromuscular re-education activities to improve: Proprioception, Posture, and motor control for 45 minutes. The following activities were included:  Tricep ext green TB 3x15  Lateral elbow mobilizations with movement red band 3x15  Wrist eccentrics 3x8-10 6#  Prone mid trap level 2 3x8  Prone shoulder ext 1# 2x8  Wall slides yellow TB 3x10  Pt  education  HEP    Np  Shoulder ryhtmic stabilizations increasing ER 6 rounds    therapeutic activities to improve functional performance for 0  minutes, including      Patient Education and Home Exercises       Education provided:   - HEP  - isometrics and tendon loading  - holding on tennis    Written Home Exercises Provided: YES. Exercises were reviewed and Jake was able to demonstrate them prior to the end of the session.  Jake demonstrated good understanding of the education provided. See EMR under Patient Instructions for exercises provided during therapy sessions    Assessment     Jake showing improvement with lateral elbow symptoms but still presenting with joint restriction. Improvement with manual and added mobilizations with movement and lateral gapping. Continued with shoulder stability and strengthening exercises. Continue to progress.     Jake Is progressing well towards his goals.   Pt prognosis is Good.     Pt will continue to benefit from skilled outpatient physical therapy to address the deficits listed in the problem list box on initial evaluation, provide pt/family education and to maximize pt's level of independence in the home and community environment.     Pt's spiritual, cultural and educational needs considered and pt agreeable to plan of care and goals.     Anticipated barriers to physical therapy: scheduling, tennis    GOALS: Short Term Goals:  2-6 weeks - progressing  1.Report decreased elbow pain < / =  3/10  to increase tolerance for tennis activities  2. Increase PROM full pain free   3. Increased strength by 1/3 MMT grade in shoulder to increase tolerance for ADL and work activities.  4. Pt to tolerate HEP to improve ROM and independence with ADL's     Long Term Goals: 8-12 weeks - progressing  1.Report decreased elbow pain  < / =  0 /10  to increase tolerance for tennis activities   2.Increase AROM to full pain free  3.Increase strength to >/= 4-/5 in scapular muscles to  increase tolerance for ADL and work activities.  4. Pt goal: return to full tennis activities with pain free  5. Pt will have improved gcode of CJ (20-40% limited) on FOTO shoulder in order to demonstrate true functional improvement.    Plan     Continue with POC    Jacoby Blandon, PT, DPT, OCS  Co-Treatment - Nixon Abarca, SPT     I certify that I was present in the room directing the student in service delivery and guiding them using my skilled judgment. As the co-signing therapist I have reviewed the students documentation and am responsible for the treatment, assessment, and plan.

## 2023-11-16 ENCOUNTER — OFFICE VISIT (OUTPATIENT)
Dept: SPORTS MEDICINE | Facility: CLINIC | Age: 53
End: 2023-11-16
Payer: COMMERCIAL

## 2023-11-16 VITALS
WEIGHT: 206.81 LBS | BODY MASS INDEX: 27.41 KG/M2 | HEART RATE: 58 BPM | DIASTOLIC BLOOD PRESSURE: 80 MMHG | HEIGHT: 73 IN | SYSTOLIC BLOOD PRESSURE: 122 MMHG

## 2023-11-16 DIAGNOSIS — M77.12 LEFT LATERAL EPICONDYLITIS: Primary | ICD-10-CM

## 2023-11-16 PROCEDURE — 99999 PR PBB SHADOW E&M-EST. PATIENT-LVL III: CPT | Mod: PBBFAC,,, | Performed by: PHYSICIAN ASSISTANT

## 2023-11-16 PROCEDURE — 1159F MED LIST DOCD IN RCRD: CPT | Mod: CPTII,S$GLB,, | Performed by: PHYSICIAN ASSISTANT

## 2023-11-16 PROCEDURE — 1159F PR MEDICATION LIST DOCUMENTED IN MEDICAL RECORD: ICD-10-PCS | Mod: CPTII,S$GLB,, | Performed by: PHYSICIAN ASSISTANT

## 2023-11-16 PROCEDURE — 3074F SYST BP LT 130 MM HG: CPT | Mod: CPTII,S$GLB,, | Performed by: PHYSICIAN ASSISTANT

## 2023-11-16 PROCEDURE — 3079F PR MOST RECENT DIASTOLIC BLOOD PRESSURE 80-89 MM HG: ICD-10-PCS | Mod: CPTII,S$GLB,, | Performed by: PHYSICIAN ASSISTANT

## 2023-11-16 PROCEDURE — 3079F DIAST BP 80-89 MM HG: CPT | Mod: CPTII,S$GLB,, | Performed by: PHYSICIAN ASSISTANT

## 2023-11-16 PROCEDURE — 99213 PR OFFICE/OUTPT VISIT, EST, LEVL III, 20-29 MIN: ICD-10-PCS | Mod: S$GLB,,, | Performed by: PHYSICIAN ASSISTANT

## 2023-11-16 PROCEDURE — 99213 OFFICE O/P EST LOW 20 MIN: CPT | Mod: S$GLB,,, | Performed by: PHYSICIAN ASSISTANT

## 2023-11-16 PROCEDURE — 3008F PR BODY MASS INDEX (BMI) DOCUMENTED: ICD-10-PCS | Mod: CPTII,S$GLB,, | Performed by: PHYSICIAN ASSISTANT

## 2023-11-16 PROCEDURE — 3008F BODY MASS INDEX DOCD: CPT | Mod: CPTII,S$GLB,, | Performed by: PHYSICIAN ASSISTANT

## 2023-11-16 PROCEDURE — 3074F PR MOST RECENT SYSTOLIC BLOOD PRESSURE < 130 MM HG: ICD-10-PCS | Mod: CPTII,S$GLB,, | Performed by: PHYSICIAN ASSISTANT

## 2023-11-16 PROCEDURE — 99999 PR PBB SHADOW E&M-EST. PATIENT-LVL III: ICD-10-PCS | Mod: PBBFAC,,, | Performed by: PHYSICIAN ASSISTANT

## 2023-11-16 NOTE — PROGRESS NOTES
Subjective:     Chief Complaint: Jake Wallace is a 53 y.o. male who had concerns including Follow-up of the Left Shoulder and Follow-up of the Left Elbow.    Patient presents to clinic for follow up left shoulder pain, left elbow pain, and left gluteus pain. He is no longer experiencing any gluteus or shoulder pain. He has been attending formal PT for his left elbow and has noticed a significant decrease in pain. He has not been playing tennis over the past 6 weeks but plans to start again this weekend.     Left elbow pain began approximately 8-10 weeks ago. No new injury. He has had left tennis elbow in the past and treated conservatively with formal PT and Tennis elbow strap. He has been working on his exercises. He is no longer feeling pain over the triceps tendon insertion.  Pain increases with playing tennis and increased activity.  He has been using voltaren gel over elbow as needed.                  Review of Systems   Constitutional: Negative. Negative for chills, fever, weight gain and weight loss.   HENT:  Negative for congestion and sore throat.    Eyes:  Negative for blurred vision and double vision.   Cardiovascular:  Negative for chest pain, leg swelling and palpitations.   Respiratory:  Negative for cough and shortness of breath.    Hematologic/Lymphatic: Does not bruise/bleed easily.   Skin:  Negative for itching, poor wound healing and rash.   Musculoskeletal:  Positive for joint pain and muscle weakness. Negative for back pain, joint swelling, myalgias and stiffness.   Gastrointestinal:  Negative for abdominal pain, constipation, diarrhea, nausea and vomiting.   Genitourinary: Negative.  Negative for frequency and hematuria.   Neurological:  Negative for dizziness, headaches, numbness, paresthesias and sensory change.   Psychiatric/Behavioral:  Negative for altered mental status and depression. The patient is not nervous/anxious.    Allergic/Immunologic: Negative for hives.                  Objective:     General: Jake is well-developed, well-nourished, appears stated age, in no acute distress, alert and oriented to time, place and person.     General    Vitals reviewed.  Constitutional: He is oriented to person, place, and time. He appears well-developed and well-nourished. No distress.   HENT:   Head: Normocephalic and atraumatic.   Eyes: EOM are normal.   Cardiovascular:  Normal rate and regular rhythm.            Pulmonary/Chest: Effort normal.   Neurological: He is alert and oriented to person, place, and time. Coordination normal.   Psychiatric: He has a normal mood and affect. His behavior is normal. Thought content normal.             Right Hand/Wrist Exam     Inspection   Scars: Wrist - absent   Effusion: Wrist - absent   Bruising: Wrist - absent   Deformity: Wrist - deformity     Range of Motion     Wrist   Extension:  50 normal   Flexion:  90 normal   Abduction: 25 normal  Adduction: 40 normal    Tests   Phalens Sign: negative  Tinel's sign (median nerve): negative  Finkelstein's test: negative      Other     Neuorologic Exam    Median Distribution: normal  Ulnar Distribution: normal  Radial Distribution: normal      Left Hand/Wrist Exam     Inspection   Scars: Wrist - absent   Effusion: Wrist - absent   Bruising: Wrist - absent   Deformity: Wrist - absent     Range of Motion     Wrist   Extension:  50 normal   Flexion:  90 normal   Abduction: 25 normal  Adduction: 40 normal    Tests   Phalens Sign: negative  Tinel's sign (median nerve): negative  Finkelstein's test: negative      Other     Sensory Exam  Median Distribution: normal  Ulnar Distribution: normal  Radial Distribution: normal      Right Elbow Exam     Inspection   Scars: absent  Effusion: absent  Bruising: absent  Deformity: absent  Atrophy: absent    Range of Motion   Extension:  0 normal   Flexion:  140 normal   Pronation:  normal   Supination:  normal     Tests   Varus: negative  Valgus: negative  Tinel's sign  (cubital tunnel): negative  Tennis Elbow: negative  Golfer's Elbow: negative  Radial Capitellar Grind: negative    Other   Sensation: normal      Left Elbow Exam     Inspection   Scars: absent  Effusion: absent  Bruising: absent  Deformity: absent  Atrophy: absent    Range of Motion   Extension:  0 normal   Flexion:  140 normal   Pronation:  normal   Supination:  normal     Tests   Varus: negative  Valgus: negative  Tinel's sign (cubital tunnel): negative  Tennis Elbow: negative  Golfer's Elbow: negative  Radial Capitellar Grind: negative    Other   Sensation: normal    Right Shoulder Exam     Muscle Strength   The patient has normal right shoulder strength.    Left Shoulder Exam     Muscle Strength   The patient has normal left shoulder strength.       Muscle Strength   Right Upper Extremity   Wrist extension: 5/5   Wrist flexion: 5/5   : 5/5   Pinch Mechanism: 5/5  Elbow Pronation:  5/5   Elbow Supination:  5/5   Elbow Extension: 5/5  Elbow Flexion: 5/5  Left Upper Extremity  Wrist extension: 5/5   Wrist flexion: 5/5   :  5/5   Pinch Mechanism: 5/5  Elbow Pronation:  5/5   Elbow Supination:  5/5   Elbow Extension: 5/5  Elbow Flexion: 5/5    RADIOGRAPHS: 10/5/23  Left shoulder:  FINDINGS:  Three views left shoulder.     The left humeral head maintains appropriate relationship with the glenoid.  The acromial clavicular joint is intact noting degenerative change.  No acute displaced left rib fracture.  The left lung zones are clear.    Left elbow:  FINDINGS:  Three views left elbow.     No significant displacement of the anterior or posterior elbow fat pads.  The anterior humeral line and radiocapitellar line are in appropriate orientation.  No acute displaced fracture or dislocation of the elbow.  No radiopaque foreign body.  No significant edema.  There are degenerative changes of the lateral humeral epicondyle.      Assessment:     Encounter Diagnosis   Name Primary?    Left lateral epicondylitis Yes           Plan:     We have discussed a variety of treatment options including medications, injections, physical therapy and other alternative treatments. I also explained the indications, risks and benefits of surgery. Given the patients hx and examination today, I believe he would benefit from physical therapy for his left elbow and left shoulder. Pt agrees and would like to proceed with continued formal physical therapy.    I made the decision to obtain old records of the patient including previous notes and imaging. I independently reviewed and interpreted lab results today as well as prior imaging. Reviewed with patient in detail.    1. OTC NSAIDs as needed.  2. Ice compress to the affected area 2-3x a day for 15-20 minutes as needed for pain management.  3. Continue physical therapy for periscapular/rotator cuff strengthening and left tennis elbow/triceps exercises at [facility]  4. RTC to see Elle jason for follow-up if continued pain    All of the patient's questions were answered and the patient will contact us if they have any questions or concerns in the interim.      Patient questionnaires may have been collected.

## 2023-11-17 ENCOUNTER — CLINICAL SUPPORT (OUTPATIENT)
Dept: REHABILITATION | Facility: HOSPITAL | Age: 53
End: 2023-11-17
Payer: COMMERCIAL

## 2023-11-17 DIAGNOSIS — M25.522 LEFT ELBOW PAIN: Primary | ICD-10-CM

## 2023-11-17 DIAGNOSIS — M25.512 CHRONIC LEFT SHOULDER PAIN: ICD-10-CM

## 2023-11-17 DIAGNOSIS — G89.29 CHRONIC LEFT SHOULDER PAIN: ICD-10-CM

## 2023-11-17 PROCEDURE — 97112 NEUROMUSCULAR REEDUCATION: CPT

## 2023-11-17 PROCEDURE — 97140 MANUAL THERAPY 1/> REGIONS: CPT

## 2023-11-17 NOTE — PROGRESS NOTES
[facility]   Physical Therapy Treatment Note      Name: Jake FARFAN Robert  Clinic Number: [id number]    Therapy Diagnosis:   Encounter Diagnoses   Name Primary?    Left elbow pain Yes    Chronic left shoulder pain        Physician: Elle Soni PA-C    Visit Date: 11/17/2023    Physician Orders: PT Eval and Treat   Medical Diagnosis from Referral: Left lateral epicondylitis [M77.12], Triceps tendonitis [M77.8], Impingement of left shoulder [M25.812]  Evaluation Date: 10/20/2023  Authorization Period Expiration: 10/4/24  Plan of Care Expiration: 1/31/24  Progress Note Due: 11/20/23  Date of Surgery: n/a  Visit # / Visits authorized: 3/20  FOTO: 1/ 3     Precautions: Standard      Time In: 8:05  Time Out: 8:56  Total Billable Time: 51 minutes    PTA Visit #: 0/5     Subjective     Pt reports: doing better, did not play tennis. No pain in his elbow.     He was compliant with home exercise program.  Response to previous treatment: slightly improved pain  Functional change: improved elbow range    Pain: 3/10  Location: left elbow  and shoulder      Objective      Objective Measures updated at progress report unless specified.     Treatment     Jake received the treatments listed below:      therapeutic exercises to develop strength, endurance, and ROM for 0 minutes including:      manual therapy techniques: Joint mobilizations were applied to the: elbow for 10 minutes, including:  Humero-ulnar general gapping  Proximal radial head mobilizations    neuromuscular re-education activities to improve: Proprioception, Posture, and motor control for 41 minutes. The following activities were included:    Lateral elbow mobilizations with movement red band 3x15  Wrist eccentrics 3x8-10 6#  Prone mid trap level 2 3x8  Prone shoulder ext 2# 3x8  Prone row 3# 3x10  Wall slides yellow TB 3x10  Serratus cross body wall slides yellow TB 2x10  Pt education  HEP    Np  Tricep ext green TB  3x15  Shoulder ryhtmic stabilizations increasing ER 6 rounds    therapeutic activities to improve functional performance for 0  minutes, including      Patient Education and Home Exercises       Education provided:   - HEP  - isometrics and tendon loading  - holding on tennis    Written Home Exercises Provided: YES. Exercises were reviewed and Jake was able to demonstrate them prior to the end of the session.  Jake demonstrated good understanding of the education provided. See EMR under Patient Instructions for exercises provided during therapy sessions    Assessment     Jake did well today and showed improvement with lateral elbow and posterior elbow symptoms. Increased resistance and weight for exercises today. Updated HEP for exercises to perform during the week and prior to tennis.     Jake Is progressing well towards his goals.   Pt prognosis is Good.     Pt will continue to benefit from skilled outpatient physical therapy to address the deficits listed in the problem list box on initial evaluation, provide pt/family education and to maximize pt's level of independence in the home and community environment.     Pt's spiritual, cultural and educational needs considered and pt agreeable to plan of care and goals.     Anticipated barriers to physical therapy: scheduling, tennis    GOALS: Short Term Goals:  2-6 weeks - progressing  1.Report decreased elbow pain < / =  3/10  to increase tolerance for tennis activities  2. Increase PROM full pain free   3. Increased strength by 1/3 MMT grade in shoulder to increase tolerance for ADL and work activities.  4. Pt to tolerate HEP to improve ROM and independence with ADL's     Long Term Goals: 8-12 weeks - progressing  1.Report decreased elbow pain  < / =  0 /10  to increase tolerance for tennis activities   2.Increase AROM to full pain free  3.Increase strength to >/= 4-/5 in scapular muscles to increase tolerance for ADL and work activities.  4. Pt goal:  return to full tennis activities with pain free  5. Pt will have improved gcode of CJ (20-40% limited) on FOTO shoulder in order to demonstrate true functional improvement.    Plan     Continue with KARRIE Blandon, PT, DPT, OCS

## 2023-12-12 ENCOUNTER — OFFICE VISIT (OUTPATIENT)
Dept: DERMATOLOGY | Facility: CLINIC | Age: 53
End: 2023-12-12
Payer: COMMERCIAL

## 2023-12-12 DIAGNOSIS — L57.0 AK (ACTINIC KERATOSIS): Primary | ICD-10-CM

## 2023-12-12 PROCEDURE — 17000 DESTRUCT PREMALG LESION: CPT | Mod: S$GLB,,, | Performed by: DERMATOLOGY

## 2023-12-12 PROCEDURE — 99999 PR PBB SHADOW E&M-EST. PATIENT-LVL III: CPT | Mod: PBBFAC,,, | Performed by: DERMATOLOGY

## 2023-12-12 PROCEDURE — 99499 UNLISTED E&M SERVICE: CPT | Mod: S$GLB,,, | Performed by: DERMATOLOGY

## 2023-12-12 PROCEDURE — 17000 PR DESTRUCTION(LASER SURGERY,CRYOSURGERY,CHEMOSURGERY),PREMALIGNANT LESIONS,FIRST LESION: ICD-10-PCS | Mod: S$GLB,,, | Performed by: DERMATOLOGY

## 2023-12-12 PROCEDURE — 99999 PR PBB SHADOW E&M-EST. PATIENT-LVL III: ICD-10-PCS | Mod: PBBFAC,,, | Performed by: DERMATOLOGY

## 2023-12-12 PROCEDURE — 99499 NO LOS: ICD-10-PCS | Mod: S$GLB,,, | Performed by: DERMATOLOGY

## 2023-12-12 NOTE — PATIENT INSTRUCTIONS

## 2023-12-12 NOTE — PROGRESS NOTES
Subjective:      Patient ID:  Jake Wallace is a 53 y.o. male who presents for   Chief Complaint   Patient presents with    Follow-up     Recheck - R superior eyebrow      History of Present Illness: The patient presents for follow up of skin check.    The patient was last seen on: 9/25/2023 DR. Feliciano for bx - R superior eyebrow - Skin, right superior eyebrow, shave biopsy:   - ACTINIC KERATOSIS.     Other skin complaints: bx site itching      Follow-up        Review of Systems   Skin:  Positive for activity-related sunscreen use (when playing tennis). Negative for daily sunscreen use and recent sunburn.   Hematologic/Lymphatic: Does not bruise/bleed easily.       Objective:   Physical Exam   Constitutional: He appears well-developed and well-nourished. No distress.   Neurological: He is alert and oriented to person, place, and time. He is not disoriented.   Psychiatric: He has a normal mood and affect.   Skin:   Areas Examined (abnormalities noted in diagram):   Head / Face Inspection Performed            Diagram Legend     Erythematous scaling macule/papule c/w actinic keratosis       Vascular papule c/w angioma      Pigmented verrucoid papule/plaque c/w seborrheic keratosis      Yellow umbilicated papule c/w sebaceous hyperplasia      Irregularly shaped tan macule c/w lentigo     1-2 mm smooth white papules consistent with Milia      Movable subcutaneous cyst with punctum c/w epidermal inclusion cyst      Subcutaneous movable cyst c/w pilar cyst      Firm pink to brown papule c/w dermatofibroma      Pedunculated fleshy papule(s) c/w skin tag(s)      Evenly pigmented macule c/w junctional nevus     Mildly variegated pigmented, slightly irregular-bordered macule c/w mildly atypical nevus      Flesh colored to evenly pigmented papule c/w intradermal nevus       Pink pearly papule/plaque c/w basal cell carcinoma      Erythematous hyperkeratotic cursted plaque c/w SCC      Surgical scar with no sign of  skin cancer recurrence      Open and closed comedones      Inflammatory papules and pustules      Verrucoid papule consistent consistent with wart     Erythematous eczematous patches and plaques     Dystrophic onycholytic nail with subungual debris c/w onychomycosis     Umbilicated papule    Erythematous-base heme-crusted tan verrucoid plaque consistent with inflamed seborrheic keratosis     Erythematous Silvery Scaling Plaque c/w Psoriasis     See annotation      Assessment / Plan:        AK (actinic keratosis) - bx proven right eyebrow  Cryosurgery Procedure Note    Verbal consent from the patient is obtained including, but not limited to, risk of hypopigmentation/hyperpigmentation, scar, recurrence of lesion. The patient is aware of the precancerous quality and need for treatment of these lesions. Liquid nitrogen cryosurgery is applied to the 1 actinic keratoses, as detailed in the physical exam, to produce a freeze injury. The patient is aware that blisters may form and is instructed on wound care with gentle cleansing and use of vaseline ointment to keep moist until healed. The patient is supplied a handout on cryosurgery and is instructed to call if lesions do not completely resolve.               Follow up in about 10 years (around 12/12/2033).

## 2024-04-08 ENCOUNTER — HOSPITAL ENCOUNTER (OUTPATIENT)
Dept: RADIOLOGY | Facility: HOSPITAL | Age: 54
Discharge: HOME | End: 2024-04-08
Attending: PHYSICIAN ASSISTANT
Payer: COMMERCIAL

## 2024-04-08 ENCOUNTER — OFFICE VISIT (OUTPATIENT)
Dept: SPORTS MEDICINE | Facility: CLINIC | Age: 54
End: 2024-04-08
Payer: COMMERCIAL

## 2024-04-08 VITALS
BODY MASS INDEX: 27.17 KG/M2 | SYSTOLIC BLOOD PRESSURE: 125 MMHG | WEIGHT: 205 LBS | HEIGHT: 73 IN | DIASTOLIC BLOOD PRESSURE: 76 MMHG | HEART RATE: 53 BPM

## 2024-04-08 DIAGNOSIS — M22.41 CHONDROMALACIA, PATELLA, RIGHT: ICD-10-CM

## 2024-04-08 DIAGNOSIS — M25.561 ACUTE PAIN OF RIGHT KNEE: Primary | ICD-10-CM

## 2024-04-08 DIAGNOSIS — M25.561 RIGHT KNEE PAIN, UNSPECIFIED CHRONICITY: ICD-10-CM

## 2024-04-08 PROCEDURE — 3008F BODY MASS INDEX DOCD: CPT | Mod: CPTII,S$GLB,, | Performed by: PHYSICIAN ASSISTANT

## 2024-04-08 PROCEDURE — 99999 PR PBB SHADOW E&M-EST. PATIENT-LVL III: CPT | Mod: PBBFAC,,, | Performed by: PHYSICIAN ASSISTANT

## 2024-04-08 PROCEDURE — 3078F DIAST BP <80 MM HG: CPT | Mod: CPTII,S$GLB,, | Performed by: PHYSICIAN ASSISTANT

## 2024-04-08 PROCEDURE — 1159F MED LIST DOCD IN RCRD: CPT | Mod: CPTII,S$GLB,, | Performed by: PHYSICIAN ASSISTANT

## 2024-04-08 PROCEDURE — 99214 OFFICE O/P EST MOD 30 MIN: CPT | Mod: S$GLB,,, | Performed by: PHYSICIAN ASSISTANT

## 2024-04-08 PROCEDURE — 73564 X-RAY EXAM KNEE 4 OR MORE: CPT | Mod: 26,50,, | Performed by: RADIOLOGY

## 2024-04-08 PROCEDURE — 1160F RVW MEDS BY RX/DR IN RCRD: CPT | Mod: CPTII,S$GLB,, | Performed by: PHYSICIAN ASSISTANT

## 2024-04-08 PROCEDURE — 3074F SYST BP LT 130 MM HG: CPT | Mod: CPTII,S$GLB,, | Performed by: PHYSICIAN ASSISTANT

## 2024-04-08 PROCEDURE — 73564 X-RAY EXAM KNEE 4 OR MORE: CPT | Mod: TC,50

## 2024-04-08 NOTE — PROGRESS NOTES
Subjective:          Chief Complaint: Jake Wallace is a 53 y.o. male who had concerns including Pain of the Right Knee.    Patient who is an  presents to clinic with right knee pain x 3 weeks.  He noticed this pain while playing multiple tennis matches one day. Pain has been becoming progressively worse and is located medial eda-patellar. Pain at rest is 0/10. Pain is 3-4/10 with running, walking, climbing stairs, and playing tennis. He has been taking Advil as needed for pain. Denies any instability, mechanical symptoms, or effusions. He typically plays tennis twice weekly. He does report limping on occasion when pain is severe. He is here today to discuss treatment options.         Review of Systems   Constitutional: Negative. Negative for chills, fever, weight gain and weight loss.   HENT:  Negative for congestion and sore throat.    Eyes:  Negative for blurred vision and double vision.   Cardiovascular:  Negative for chest pain, leg swelling and palpitations.   Respiratory:  Negative for cough and shortness of breath.    Hematologic/Lymphatic: Does not bruise/bleed easily.   Skin:  Negative for itching, poor wound healing and rash.   Musculoskeletal:  Positive for joint pain. Negative for back pain, joint swelling, muscle weakness, myalgias and stiffness.   Gastrointestinal:  Negative for abdominal pain, constipation, diarrhea, nausea and vomiting.   Genitourinary: Negative.  Negative for frequency and hematuria.   Neurological:  Negative for dizziness, headaches, numbness, paresthesias and sensory change.   Psychiatric/Behavioral:  Negative for altered mental status and depression. The patient is not nervous/anxious.    Allergic/Immunologic: Negative for hives.       Pain Related Questions  Over the past 3 days, what was your highest pain level?: 6  Over the past 3 days, what was your lowest pain level? : 6    Other  How many nights a week are you awakened by your affected body part?: 0  Was the  patient's HEIGHT measured or patient reported?: Patient Reported  Was the patient's WEIGHT measured or patient reported?: Measured      Objective:        General: Jake is well-developed, well-nourished, appears stated age, in no acute distress, alert and oriented to time, place and person.     General    Vitals reviewed.  Constitutional: He is oriented to person, place, and time. He appears well-developed and well-nourished. No distress.   HENT:   Head: Normocephalic and atraumatic.   Eyes: EOM are normal.   Cardiovascular:  Normal rate and regular rhythm.            Pulmonary/Chest: Effort normal. No respiratory distress.   Neurological: He is alert and oriented to person, place, and time. He has normal reflexes. No cranial nerve deficit. Coordination normal.   Psychiatric: He has a normal mood and affect. His behavior is normal. Judgment and thought content normal.     General Musculoskeletal Exam   Gait: normal       Right Knee Exam     Inspection   Erythema: absent  Scars: absent  Swelling: absent  Effusion: absent  Deformity: absent  Bruising: absent    Tenderness   The patient is tender to palpation of the patella.    Range of Motion   Extension:  0 normal   Flexion:  140 normal     Tests   Meniscus   Anam:  Medial - negative Lateral - negative  Ligament Examination   Lachman: normal (-1 to 2mm)   PCL-Posterior Drawer: normal (0 to 2mm)     MCL - Valgus: normal (0 to 2mm)  LCL - Varus: normal  Pivot Shift: normal (Equal)  Reverse Pivot Shift: normal (Equal)  Posterolateral Corner: stable  Patella   Passive Patellar Tilt: neutral  Patellar Tracking: normal  Patellar Glide (quadrants): Lateral - 1   Medial - 2  Patellar Grind: negative    Other   Sensation: normal    Left Knee Exam     Inspection   Erythema: absent  Scars: absent  Swelling: absent  Effusion: absent  Deformity: absent  Bruising: absent    Tenderness   The patient is experiencing no tenderness.     Range of Motion   Extension:  0 normal    Flexion:  140 normal     Tests   Meniscus   Anam:  Medial - negative Lateral - negative  Stability   Lachman: normal (-1 to 2mm)   PCL-Posterior Drawer: normal (0 to 2mm)  MCL - Valgus: normal (0 to 2mm)  LCL - Varus: normal (0 to 2mm)  Pivot Shift: normal (Equal)  Reverse Pivot Shift: normal (Equal)  Posterolateral Corner: stable  Patella   Passive Patellar Tilt: neutral  Patellar Tracking: normal  Patellar Glide (Quadrants): Lateral - 1 Medial - 2  Patellar Grind: negative    Other   Sensation: normal    Muscle Strength   Right Lower Extremity   Hip Abduction: 5/5   Quadriceps:  5/5   Hamstrin/5   Left Lower Extremity   Hip Abduction: 5/5   Quadriceps:  5/5   Hamstrin/5     Reflexes     Left Side  Achilles:  2+  Quadriceps:  2+    Right Side   Achilles:  2+  Quadriceps:  2+    Vascular Exam     Right Pulses  Dorsalis Pedis:      2+  Posterior Tibial:      2+        Left Pulses  Dorsalis Pedis:      2+  Posterior Tibial:      2+        Radiographs: 24  Bilateral knees:  FINDINGS:  No significant joint space narrowing.  No fracture or dislocation.  No bone destruction identified      Assessment:       Encounter Diagnoses   Name Primary?    Acute pain of right knee Yes    Chondromalacia, patella, right          Plan:       We have discussed a variety of treatment options including medications, injections, physical therapy and other alternative treatments. I also explained the indications, risks and benefits of surgery. Given the patients hx and examination today, I believe he would benefit from rest and possible MRI of right knee to evaluate for patella chondromalacia. Pt would like to rest at this time and will message me via patient portal regarding his symptoms.    I made the decision to obtain old records of the patient including previous notes and imaging. I independently reviewed and interpreted lab results today as well as prior imaging.  Reviewed with patient in detail.    1. OTC NSAIDs as  needed.  2. Possible MRI of right knee to evaluate for patella chondromalacia if continued pain.  3. Ice compress to the affected area 2-3x a day for 15-20 minutes as needed for pain management.  4. RTC to see Elle jason.       All of the patient's questions were answered and the patient will contact us if they have any questions or concerns in the interim.                    Patient questionnaires may have been collected.

## 2024-10-11 ENCOUNTER — IMMUNIZATION (OUTPATIENT)
Dept: INTERNAL MEDICINE | Facility: CLINIC | Age: 54
End: 2024-10-11
Payer: COMMERCIAL

## 2024-10-11 DIAGNOSIS — Z23 NEED FOR VACCINATION: Primary | ICD-10-CM

## 2025-03-01 ENCOUNTER — ON-DEMAND VIRTUAL (OUTPATIENT)
Dept: URGENT CARE | Facility: CLINIC | Age: 55
End: 2025-03-01
Payer: COMMERCIAL

## 2025-03-01 DIAGNOSIS — K52.9 GASTROENTERITIS: Primary | ICD-10-CM

## 2025-03-01 RX ORDER — ONDANSETRON 4 MG/1
4 TABLET, ORALLY DISINTEGRATING ORAL EVERY 8 HOURS PRN
Qty: 15 TABLET | Refills: 0 | Status: SHIPPED | OUTPATIENT
Start: 2025-03-01 | End: 2025-03-06

## 2025-03-01 NOTE — PATIENT INSTRUCTIONS
Given history and symptoms further in-person evaluation may be needed for a diagnosis and treatment plan.    If pain persists or worsens go to ER.     Patient encouraged to monitor symptoms closely and instructed to follow-up for new or worsening symptoms. Further, in-person, evaluation is necessary for continued treatment. Please follow up in Urgent Care, ER, or  with your primary care doctor or specialist as needed. Verbally discussed plan. Patient confirms understanding and is in agreement with treatment and plan.     You must understand that you've received a [facility] Care evaluation only and that you may be released before all your medical problems are known or treated. You, the patient, will arrange for follow-up care as instructed.

## 2025-03-01 NOTE — PROGRESS NOTES
Subjective:      Patient ID: Jake Wallace is a 54 y.o. male.    Vitals:  vitals were not taken for this visit.     Chief Complaint: Abdominal Pain      Visit Type: TELE AUDIOVISUAL    Patient Location: Home     Present with the patient at the time of consultation: TELEMED PRESENT WITH PATIENT: None    Past Medical History:   Diagnosis Date    Atrial fibrillation     Basal cell carcinoma     Generalized anxiety disorder     Hypercholesteremia     Hypogonadism in male     Vitamin D deficiency      Past Surgical History:   Procedure Laterality Date    COLONOSCOPY N/A 7/15/2020    Procedure: COLONOSCOPY;  Surgeon: Nitin Raymond MD;  Location: [facility];  Service: Endoscopy;  Laterality: N/A;     Review of patient's allergies indicates:   Allergen Reactions    Penicillins      Medications Ordered Prior to Encounter[1]  Family History   Problem Relation Name Age of Onset    Melanoma Neg Hx         Medications Ordered                [facility] [address], [place] [address]    Telephone: [phone]   Fax: [phone]   Hours: Always Open                         Internal Pharmacy (1 of 1)              ondansetron (ZOFRAN-ODT) 4 MG TbDL    Sig: Take 1 tablet (4 mg total) by mouth every 8 (eight) hours as needed (nausea).       Start: 3/1/25     Quantity: 15 tablet Refills: 0                           Ohs Peq Odvv Intake    3/1/2025  7:05 AM CST - Filed by Patient   What is your current physical address in the event of a medical emergency? [address]   Are you able to take your vital signs? Yes   Systolic Blood Pressure: 170   Diastolic Blood Pressure: 102   Weight: 200   Height: 72   Pulse: 61   Temperature:    Respiration rate:    Pulse Oxygen: 100   Please attach any relevant images or files    Is your employer contracted with [facility]? No         Pain in left abdomen, radiates to back and to groin. Started early this morning. Pain did not wake him. Pain  is achy. Normal BM this morning. Nausea but no vomiting. No fever.     Abdominal Pain  Associated symptoms include nausea. Pertinent negatives include no diarrhea, dysuria, fever, frequency, hematochezia or vomiting.       Constitution: Negative for fever.   Gastrointestinal:  Positive for abdominal pain and nausea. Negative for abdominal bloating, vomiting, diarrhea, bright red blood in stool, rectal bleeding, rectal pain and bowel incontinence.   Genitourinary:  Negative for dysuria, frequency, urgency, urine decreased, flank pain and bladder incontinence.        Objective:   The physical exam was conducted virtually.  Physical Exam   Abdominal: Normal appearance.   Neurological: He is alert.       Assessment:     1. Gastroenteritis        Plan:       Gastroenteritis    Other orders  -     ondansetron (ZOFRAN-ODT) 4 MG TbDL; Take 1 tablet (4 mg total) by mouth every 8 (eight) hours as needed (nausea).  Dispense: 15 tablet; Refill: 0                        [1]   Current Outpatient Medications on File Prior to Visit   Medication Sig Dispense Refill    clindamycin (CLEOCIN T) 1 % external solution Aaa bumps of scalp or inflammed follicles when flaring 30 mL 5    ELIQUIS 5 mg Tab Take 5 mg by mouth 2 (two) times daily.      ketoconazole (NIZORAL) 2 % shampoo Wash hair with medicated shampoo at least 2x/week - let sit on scalp at least 5 minutes prior to rinsing 120 mL 5    LORazepam (ATIVAN) 0.5 MG tablet lorazepam 0.5 mg tablet   TAKE 1 TABLET BY MOUTH TWICE A DAY AS NEEDED FOR ANXIETY      metoprolol tartrate (LOPRESSOR) 50 MG tablet Take 50 mg by mouth 2 (two) times daily.      testosterone (ANDROGEL) 1 % (50 mg/5 gram) GlPk testosterone 50 mg/5 gram (1 %) transdermal gel   APPLY CONTENTS OF ONE PACKET TO SKIN DAILY AS DIRECTED      valACYclovir (VALTREX) 1000 MG tablet valacyclovir 1 gram tablet       No current facility-administered medications on file prior to visit.

## 2025-04-27 ENCOUNTER — HOSPITAL ENCOUNTER (EMERGENCY)
Facility: HOSPITAL | Age: 55
Discharge: HOME | End: 2025-04-28
Attending: EMERGENCY MEDICINE
Payer: COMMERCIAL

## 2025-04-27 DIAGNOSIS — N13.2 URETERAL STONE WITH HYDRONEPHROSIS: Primary | ICD-10-CM

## 2025-04-27 LAB
ABSOLUTE EOSINOPHIL (OHS): 0.09 K/UL
ABSOLUTE MONOCYTE (OHS): 1.1 K/UL (ref 0.3–1)
ABSOLUTE NEUTROPHIL COUNT (OHS): 7.81 K/UL (ref 1.8–7.7)
ALBUMIN SERPL BCP-MCNC: 4 G/DL (ref 3.5–5.2)
ALP SERPL-CCNC: 57 UNIT/L (ref 40–150)
ALT SERPL W/O P-5'-P-CCNC: 14 UNIT/L (ref 10–44)
ANION GAP (OHS): 14 MMOL/L (ref 8–16)
AST SERPL-CCNC: 21 UNIT/L (ref 11–45)
BACTERIA #/AREA URNS AUTO: ABNORMAL /HPF
BASOPHILS # BLD AUTO: 0.02 K/UL
BASOPHILS NFR BLD AUTO: 0.2 %
BILIRUB SERPL-MCNC: 0.4 MG/DL (ref 0.1–1)
BILIRUB UR QL STRIP.AUTO: NEGATIVE
BUN SERPL-MCNC: 13 MG/DL (ref 6–20)
BUN SERPL-MCNC: 13 MG/DL (ref 6–30)
CALCIUM SERPL-MCNC: 9.5 MG/DL (ref 8.7–10.5)
CAOX CRY UR QL COMP ASSIST: ABNORMAL
CHLORIDE SERPL-SCNC: 103 MMOL/L (ref 95–110)
CHLORIDE SERPL-SCNC: 104 MMOL/L (ref 95–110)
CLARITY UR: CLEAR
CO2 SERPL-SCNC: 23 MMOL/L (ref 23–29)
COLOR UR AUTO: YELLOW
CREAT SERPL-MCNC: 1.1 MG/DL (ref 0.5–1.4)
CREAT SERPL-MCNC: 1.3 MG/DL (ref 0.5–1.4)
CRP SERPL-MCNC: 1.3 MG/L
ERYTHROCYTE [DISTWIDTH] IN BLOOD BY AUTOMATED COUNT: 11.9 % (ref 11.5–14.5)
GFR SERPLBLD CREATININE-BSD FMLA CKD-EPI: >60 ML/MIN/1.73/M2
GLUCOSE SERPL-MCNC: 92 MG/DL (ref 70–110)
GLUCOSE SERPL-MCNC: 97 MG/DL (ref 70–110)
GLUCOSE UR QL STRIP: NEGATIVE
HCT VFR BLD AUTO: 42.1 % (ref 40–54)
HCT VFR BLD CALC: 43 %PCV (ref 36–54)
HCV AB SERPL QL IA: NORMAL
HGB BLD-MCNC: 15 GM/DL (ref 14–18)
HGB UR QL STRIP: ABNORMAL
HIV 1+2 AB+HIV1 P24 AG SERPL QL IA: NORMAL
HYALINE CASTS UR QL AUTO: 0 /LPF (ref 0–1)
IMM GRANULOCYTES # BLD AUTO: 0.03 K/UL (ref 0–0.04)
IMM GRANULOCYTES NFR BLD AUTO: 0.3 % (ref 0–0.5)
KETONES UR QL STRIP: ABNORMAL
LEUKOCYTE ESTERASE UR QL STRIP: NEGATIVE
LIPASE SERPL-CCNC: 27 U/L (ref 4–60)
LYMPHOCYTES # BLD AUTO: 1.13 K/UL (ref 1–4.8)
MCH RBC QN AUTO: 33.6 PG (ref 27–31)
MCHC RBC AUTO-ENTMCNC: 35.6 G/DL (ref 32–36)
MCV RBC AUTO: 94 FL (ref 82–98)
MICROSCOPIC COMMENT: ABNORMAL
NITRITE UR QL STRIP: NEGATIVE
NUCLEATED RBC (/100WBC) (OHS): 0 /100 WBC
PH UR STRIP: 6 [PH]
PLATELET # BLD AUTO: 180 K/UL (ref 150–450)
PMV BLD AUTO: 10.2 FL (ref 9.2–12.9)
POC IONIZED CALCIUM: 1.19 MMOL/L (ref 1.06–1.42)
POC TCO2 (MEASURED): 25 MMOL/L (ref 23–29)
POTASSIUM BLD-SCNC: 3.8 MMOL/L (ref 3.5–5.1)
POTASSIUM SERPL-SCNC: 3.9 MMOL/L (ref 3.5–5.1)
PROT SERPL-MCNC: 7.2 GM/DL (ref 6–8.4)
PROT UR QL STRIP: ABNORMAL
RBC # BLD AUTO: 4.47 M/UL (ref 4.6–6.2)
RBC #/AREA URNS AUTO: 70 /HPF (ref 0–4)
RELATIVE EOSINOPHIL (OHS): 0.9 %
RELATIVE LYMPHOCYTE (OHS): 11.1 % (ref 18–48)
RELATIVE MONOCYTE (OHS): 10.8 % (ref 4–15)
RELATIVE NEUTROPHIL (OHS): 76.7 % (ref 38–73)
SAMPLE: NORMAL
SODIUM BLD-SCNC: 139 MMOL/L (ref 136–145)
SODIUM SERPL-SCNC: 141 MMOL/L (ref 136–145)
SP GR UR STRIP: >=1.03
SQUAMOUS #/AREA URNS AUTO: <1 /HPF
UROBILINOGEN UR STRIP-ACNC: ABNORMAL EU/DL
WBC # BLD AUTO: 10.18 K/UL (ref 3.9–12.7)
WBC #/AREA URNS AUTO: 5 /HPF (ref 0–5)
YEAST UR QL AUTO: ABNORMAL /HPF

## 2025-04-27 PROCEDURE — 83690 ASSAY OF LIPASE: CPT | Performed by: EMERGENCY MEDICINE

## 2025-04-27 PROCEDURE — 25000003 PHARM REV CODE 250: Performed by: EMERGENCY MEDICINE

## 2025-04-27 PROCEDURE — 96361 HYDRATE IV INFUSION ADD-ON: CPT

## 2025-04-27 PROCEDURE — 99285 EMERGENCY DEPT VISIT HI MDM: CPT | Mod: 25

## 2025-04-27 PROCEDURE — 82040 ASSAY OF SERUM ALBUMIN: CPT | Performed by: EMERGENCY MEDICINE

## 2025-04-27 PROCEDURE — 80047 BASIC METABLC PNL IONIZED CA: CPT

## 2025-04-27 PROCEDURE — 86803 HEPATITIS C AB TEST: CPT | Performed by: PHYSICIAN ASSISTANT

## 2025-04-27 PROCEDURE — 81003 URINALYSIS AUTO W/O SCOPE: CPT | Performed by: EMERGENCY MEDICINE

## 2025-04-27 PROCEDURE — 96374 THER/PROPH/DIAG INJ IV PUSH: CPT

## 2025-04-27 PROCEDURE — 87389 HIV-1 AG W/HIV-1&-2 AB AG IA: CPT | Performed by: PHYSICIAN ASSISTANT

## 2025-04-27 PROCEDURE — 86140 C-REACTIVE PROTEIN: CPT | Performed by: EMERGENCY MEDICINE

## 2025-04-27 PROCEDURE — 85025 COMPLETE CBC W/AUTO DIFF WBC: CPT | Performed by: EMERGENCY MEDICINE

## 2025-04-27 PROCEDURE — 63600175 PHARM REV CODE 636 W HCPCS: Performed by: EMERGENCY MEDICINE

## 2025-04-27 PROCEDURE — 96375 TX/PRO/DX INJ NEW DRUG ADDON: CPT

## 2025-04-27 RX ORDER — MORPHINE SULFATE 4 MG/ML
4 INJECTION, SOLUTION INTRAMUSCULAR; INTRAVENOUS
Refills: 0 | Status: COMPLETED | OUTPATIENT
Start: 2025-04-27 | End: 2025-04-27

## 2025-04-27 RX ORDER — ONDANSETRON HYDROCHLORIDE 2 MG/ML
4 INJECTION, SOLUTION INTRAVENOUS
Status: COMPLETED | OUTPATIENT
Start: 2025-04-27 | End: 2025-04-27

## 2025-04-27 RX ORDER — KETOROLAC TROMETHAMINE 30 MG/ML
15 INJECTION, SOLUTION INTRAMUSCULAR; INTRAVENOUS
Status: COMPLETED | OUTPATIENT
Start: 2025-04-27 | End: 2025-04-27

## 2025-04-27 RX ADMIN — KETOROLAC TROMETHAMINE 15 MG: 30 INJECTION, SOLUTION INTRAMUSCULAR; INTRAVENOUS at 10:04

## 2025-04-27 RX ADMIN — MORPHINE SULFATE 4 MG: 4 INJECTION INTRAVENOUS at 10:04

## 2025-04-27 RX ADMIN — SODIUM CHLORIDE 1000 ML: 9 INJECTION, SOLUTION INTRAVENOUS at 10:04

## 2025-04-27 RX ADMIN — ONDANSETRON 4 MG: 2 INJECTION INTRAMUSCULAR; INTRAVENOUS at 10:04

## 2025-04-28 VITALS
DIASTOLIC BLOOD PRESSURE: 81 MMHG | RESPIRATION RATE: 16 BRPM | BODY MASS INDEX: 27.09 KG/M2 | TEMPERATURE: 98 F | WEIGHT: 200 LBS | OXYGEN SATURATION: 96 % | HEIGHT: 72 IN | SYSTOLIC BLOOD PRESSURE: 149 MMHG | HEART RATE: 70 BPM

## 2025-04-28 LAB — HOLD SPECIMEN: NORMAL

## 2025-04-28 RX ORDER — ACETAMINOPHEN 500 MG
1000 TABLET ORAL 3 TIMES DAILY PRN
Qty: 30 TABLET | Refills: 0 | Status: SHIPPED | OUTPATIENT
Start: 2025-04-27

## 2025-04-28 RX ORDER — MORPHINE SULFATE 15 MG/1
7.5 TABLET ORAL EVERY 8 HOURS PRN
Qty: 6 TABLET | Refills: 0 | Status: SHIPPED | OUTPATIENT
Start: 2025-04-27 | End: 2025-05-02

## 2025-04-28 RX ORDER — ONDANSETRON 4 MG/1
4 TABLET, FILM COATED ORAL 3 TIMES DAILY PRN
Qty: 20 TABLET | Refills: 0 | Status: SHIPPED | OUTPATIENT
Start: 2025-04-27

## 2025-04-28 RX ORDER — IBUPROFEN 200 MG
600 TABLET ORAL EVERY 6 HOURS PRN
Qty: 30 TABLET | Refills: 0 | Status: SHIPPED | OUTPATIENT
Start: 2025-04-27

## 2025-04-28 NOTE — ED TRIAGE NOTES
Jaek ADOLPH LizarragaTununak, a 54 y.o. male presents to the ED w/ complaint of LLQ abd/L flank pain that started at approx. 1730 today. Pt confirms that they had 1 episode of vomiting and difficulty urinating today.     Triage note:  Chief Complaint   Patient presents with    Flank Pain     Pt has known kidney stones for the past 6 wks. Pt endorsing L-sided flank pain. +nausea and vomiting.      Review of patient's allergies indicates:   Allergen Reactions    Penicillins      Past Medical History:   Diagnosis Date    Atrial fibrillation     Basal cell carcinoma     Generalized anxiety disorder     Hypercholesteremia     Hypogonadism in male     Vitamin D deficiency

## 2025-04-28 NOTE — ED PROVIDER NOTES
Encounter Date: 4/27/2025       History     Chief Complaint   Patient presents with    Flank Pain     Pt has known kidney stones for the past 6 wks. Pt endorsing L-sided flank pain. +nausea and vomiting.      HPI  54-year-old male with a history of atrial fibrillation, anxiety, hyperlipidemia.  Patient presents with complaint of intermittent left flank pain for 2 months.  Radiates to left groin and perineum.  Associated with urinary retention.  Patient has had 1 episode of emesis tonight.  He believes he is dehydrated after has been at HS past.  He is concerned about a kidney stone.  He has a family history of kidney stones but no personal history of kidney stone.  He was seen on March 1, 2025 and diagnosed with gastroenteritis.  Review of patient's allergies indicates:   Allergen Reactions    Penicillins      Past Medical History:   Diagnosis Date    Atrial fibrillation     Basal cell carcinoma     Generalized anxiety disorder     Hypercholesteremia     Hypogonadism in male     Vitamin D deficiency      Past Surgical History:   Procedure Laterality Date    COLONOSCOPY N/A 7/15/2020    Procedure: COLONOSCOPY;  Surgeon: Nitin Raymond MD;  Location: [facility];  Service: Endoscopy;  Laterality: N/A;     Family History   Problem Relation Name Age of Onset    Melanoma Neg Hx       Social History[1]  Review of Systems  All other systems reviewed and negative except as noted in HPI    Physical Exam     Initial Vitals [04/27/25 2121]   BP Pulse Resp Temp SpO2   (!) 146/83 71 16 98 °F (36.7 °C) 96 %      MAP       --         Physical Exam    Nursing note and vitals reviewed.  Constitutional: He appears well-developed and well-nourished. He appears distressed (Appears uncomfortable due to pain).   HENT:   Head: Normocephalic and atraumatic.   Eyes: Conjunctivae and EOM are normal. Pupils are equal, round, and reactive to light.   Neck: Neck supple.   Normal range of motion.  Cardiovascular:  Normal rate, regular rhythm  and intact distal pulses.           Pulmonary/Chest: Breath sounds normal. No respiratory distress. He exhibits no tenderness.   Abdominal: Abdomen is soft. He exhibits no distension.   No CVA tenderness to palpation bilaterally There is no rebound.   Musculoskeletal:         General: No tenderness or edema. Normal range of motion.      Cervical back: Normal range of motion and neck supple.     Neurological: He is alert and oriented to person, place, and time. He has normal strength. No cranial nerve deficit or sensory deficit. GCS score is 15. GCS eye subscore is 4. GCS verbal subscore is 5. GCS motor subscore is 6.   Skin: Skin is warm and dry. Capillary refill takes less than 2 seconds.   Psychiatric: He has a normal mood and affect.         ED Course   Procedures  Labs Reviewed   URINALYSIS, REFLEX TO URINE CULTURE - Abnormal       Result Value    Color, UA Yellow      Appearance, UA Clear      pH, UA 6.0      Spec Grav UA >=1.030 (*)     Protein, UA 1+ (*)     Glucose, UA Negative      Ketones, UA 2+ (*)     Bilirubin, UA Negative      Blood, UA 3+ (*)     Nitrites, UA Negative      Urobilinogen, UA 2.0-3.0 (*)     Leukocyte Esterase, UA Negative     CBC WITH DIFFERENTIAL - Abnormal    WBC 10.18      RBC 4.47 (*)     HGB 15.0      HCT 42.1      MCV 94      MCH 33.6 (*)     MCHC 35.6      RDW 11.9      Platelet Count 180      MPV 10.2      Nucleated RBC 0      Neut % 76.7 (*)     Lymph % 11.1 (*)     Mono % 10.8      Eos % 0.9      Basophil % 0.2      Imm Grans % 0.3      Neut # 7.81 (*)     Lymph # 1.13      Mono # 1.10 (*)     Eos # 0.09      Baso # 0.02      Imm Grans # 0.03     URINALYSIS MICROSCOPIC - Abnormal    RBC, UA 70 (*)     WBC, UA 5      Bacteria, UA None      Yeast, UA None      Squamous Epithelial Cells, UA <1      Hyaline Casts, UA 0      Calcium Oxalate Crystals, UA Few      Microscopic Comment       HEPATITIS C ANTIBODY - Normal    Hep C Ab Interp Non-Reactive     HIV 1 / 2 ANTIBODY - Normal     HIV 1/2 Ag/Ab Non-Reactive     COMPREHENSIVE METABOLIC PANEL - Normal    Sodium 141      Potassium 3.9      Chloride 104      CO2 23      Glucose 92      BUN 13      Creatinine 1.1      Calcium 9.5      Protein Total 7.2      Albumin 4.0      Bilirubin Total 0.4      ALP 57      AST 21      ALT 14      Anion Gap 14      eGFR >60     LIPASE - Normal    Lipase Level 27     C-REACTIVE PROTEIN - Normal    CRP 1.3     CBC W/ AUTO DIFFERENTIAL    Narrative:     The following orders were created for panel order CBC W/ AUTO DIFFERENTIAL.  Procedure                               Abnormality         Status                     ---------                               -----------         ------                     CBC with Differential[[id number]]       Abnormal            Final result                 Please view results for these tests on the individual orders.   GREY TOP URINE HOLD    Extra Tube Hold for add-ons.     HEP C VIRUS HOLD SPECIMEN   ISTAT PROCEDURE    POC Glucose 97      POC BUN 13      POC Creatinine 1.3      POC Sodium 139      POC Potassium 3.8      POC Chloride 103      POC TCO2 (MEASURED) 25      POC Ionized Calcium 1.19      POC Hematocrit 43      Sample KAREEM     ISTAT CHEM8          Imaging Results               CT Renal Stone Study ABD Pelvis WO (Final result)  Result time 04/27/25 23:28:50      Final result by Jeronimo Mackenzie MD (04/27/25 23:28:50)                   Impression:      Distal left ureteral calculus with mild to moderate hydroureteronephrosis and moderate to prominent perinephric stranding associated with the left kidney.    Nonobstructing nephrolithiasis of the left kidney is also noted.    Mild density of the gallbladder may relate to sludge and/or cholelithiasis, there is no evidence for pericholecystic inflammatory change.    Central abdominal mesenteric haziness with multiple small lymph nodes, nonspecific, may be reactive, can be seen with mesenteric panniculitis or a  lymphoproliferative process.    Additional findings as above.    This report was flagged in Epic as abnormal.      Electronically signed by: Jeronimo Avril  Date:    04/27/2025  Time:    23:28               Narrative:    EXAMINATION:  CT RENAL STONE STUDY ABD PELVIS WO    CLINICAL HISTORY:  Flank pain, kidney stone suspected;    TECHNIQUE:  Low dose axial images, sagittal and coronal reformations were obtained from the lung bases to the pubic symphysis.  Contrast was not administered.    COMPARISON:  None    FINDINGS:  As best seen on axial image 162 at the level of the distal left ureter near the level of the left ureterovesicular junction, there is a distal left ureteral calculus measuring approximately 4.3 mm on axial imaging, this measures approximately 5.8 mm on coronal reconstruction imaging.  There is associated mild left hydroureter with mild to moderate left hydronephrosis, there is periureteral and moderate to prominent perinephric stranding noted.  Nonobstructing nephrolithiasis of the left kidney is also noted.    There is no evidence for right-sided ureteral calculus or obstructive uropathy or perinephric inflammatory change.  The urinary bladder is decompressed, and otherwise not optimally evaluated.    The visualized lung bases appear clear.  There is a small hypodensity of the right lobe of the liver measuring approximately 6.3 Hounsfield units, potentially a small cyst.  Mild diminished attenuation of the liver may relate to mild fatty infiltrate.  Mild density of the gallbladder may relate to sludge and/or cholelithiasis, there is no pericholecystic inflammatory change.  There is no evidence for acute process of the stomach, pancreas, spleen or adrenal glands.  The abdominal aorta appears normal in caliber, otherwise not optimally evaluated on this noncontrast examination.  Tiny fat density left inguinal hernia noted without bowel involvement.    There is central mesenteric haziness with multiple  small mesenteric lymph nodes, a nonspecific finding, may be reactive, can be seen with mesenteric panniculitis or a lymphoproliferative process.    There is no evidence for small bowel obstructive process.  The appendix is identified, it does not appear inflamed.  There is no evidence for inflammatory or obstructive process of the colon.  There are a few diverticuli noted, there is no evidence for acute diverticulitis.  There is no evidence for free intraperitoneal air.    The visualized osseous structures appear intact.  Mild chronic change noted.                                       Medications   ondansetron injection 4 mg (4 mg Intravenous Given 4/27/25 2234)   ketorolac injection 15 mg (15 mg Intravenous Given 4/27/25 2235)   sodium chloride 0.9% bolus 1,000 mL 1,000 mL (0 mLs Intravenous Stopped 4/27/25 2338)   morphine injection 4 mg (4 mg Intravenous Given 4/27/25 2235)     Medical Decision Making  Greatest concern is for prostatitis with urinary retention and pyelonephritis versus more likely kidney stone with left-sided ureterolithiasis.  Patient has no history of hypertension or tobacco use in his only 54 years old, acute aortic pathology less likely.  Will treat symptomatically.  Will obtain urinalysis.  Will obtain CT to assess for nephrolithiasis.  1:15 AM  CT w/ L [id number] UVJ stone. UA not c/w infection. Wbc normal. Symptoms resolved. DC w/ symptomatic relief medication and f/u with urology    Amount and/or Complexity of Data Reviewed  Labs: ordered.  Radiology: ordered.    Risk  OTC drugs.  Prescription drug management.               ED Course as of 04/28/25 0116   Sun Apr 27, 2025   9757 Urinalysis shows microscopic hematuria consistent with ureterolithiasis.  Independent review of CT shows left UVJ stone with some hydroureter.  Urinalysis is not consistent with infection.  White blood cell count is normal.  Renal function is normal.  Will plan to treat symptomatically and arrange follow up with  Urology as outpatient. [DS]      ED Course User Index  [DS] Tomás Pickering MD                           Clinical Impression:  Final diagnoses:  [N13.2] Ureteral stone with hydronephrosis (Primary)          ED Disposition Condition    Discharge           ED Prescriptions       Medication Sig Dispense Start Date End Date Auth. Provider    morphine (MSIR) 15 MG tablet Take 0.5 tablets (7.5 mg total) by mouth every 8 (eight) hours as needed for Pain. 6 tablet 4/27/2025 5/2/2025 Tomás Pickering MD    ondansetron (ZOFRAN) 4 MG tablet Take 1 tablet (4 mg total) by mouth 3 (three) times daily as needed for Nausea. 20 tablet 4/27/2025 -- Ladan, Tomás CARTAGENA MD    ibuprofen (ADVIL,MOTRIN) 200 MG tablet Take 3 tablets (600 mg total) by mouth every 6 (six) hours as needed for Pain. 30 tablet 4/27/2025 -- Ladan, Tomás CARTAGENA MD    acetaminophen (TYLENOL) 500 MG tablet Take 2 tablets (1,000 mg total) by mouth 3 (three) times daily as needed for Pain. 30 tablet 4/27/2025 -- Ladan, Tomás CARTAGENA MD          Follow-up Information    None            [1]   Social History  Tobacco Use    Smoking status: Never    Smokeless tobacco: Never   Substance Use Topics    Alcohol use: Yes     Alcohol/week: 14.0 standard drinks of alcohol     Types: 14 Shots of liquor per week     Comment: about 2 drinks per day    Drug use: Never        Tomás Pickering MD  04/28/25 0116

## 2025-04-28 NOTE — DISCHARGE INSTRUCTIONS
If you experience fever, inability to urinate, or any new or concerning symptoms, return to the emergency department     Take the morphine tonight when you get home just.  Take the Tylenol and ibuprofen alternating every 6 to 8 hours until you pass the stone.  Take the morphine for any pain not relieved by Tylenol and ibuprofen    The urology clinic call you to arrange a follow up appointment

## 2025-04-28 NOTE — ED NOTES
I-STAT Chem-8+ Results:   Value Reference Range   Sodium 139 136-145 mmol/L   Potassium  3.8 3.5-5.1 mmol/L   Chloride 103  mmol/L   Ionized Calcium 1.19 1.06-1.42 mmol/L   CO2 (measured) 25 23-29 mmol/L   Glucose 97  mg/dL   BUN 13 6-30 mg/dL   Creatinine 1.3 0.5-1.4 mg/dL   Hematocrit 43 36-54%

## 2025-04-29 ENCOUNTER — HOSPITAL ENCOUNTER (OUTPATIENT)
Dept: RADIOLOGY | Facility: HOSPITAL | Age: 55
Discharge: HOME | End: 2025-04-29
Attending: UROLOGY
Payer: COMMERCIAL

## 2025-04-29 ENCOUNTER — OFFICE VISIT (OUTPATIENT)
Dept: UROLOGY | Facility: CLINIC | Age: 55
End: 2025-04-29
Payer: COMMERCIAL

## 2025-04-29 ENCOUNTER — RESULTS FOLLOW-UP (OUTPATIENT)
Dept: UROLOGY | Facility: HOSPITAL | Age: 55
End: 2025-04-29

## 2025-04-29 VITALS — DIASTOLIC BLOOD PRESSURE: 76 MMHG | SYSTOLIC BLOOD PRESSURE: 157 MMHG | HEART RATE: 50 BPM

## 2025-04-29 DIAGNOSIS — N13.2 URETERAL STONE WITH HYDRONEPHROSIS: ICD-10-CM

## 2025-04-29 LAB
BILIRUB UR QL STRIP.AUTO: NEGATIVE
CLARITY UR: CLEAR
COLOR UR AUTO: COLORLESS
GLUCOSE UR QL STRIP: NEGATIVE
HGB UR QL STRIP: ABNORMAL
KETONES UR QL STRIP: NEGATIVE
LEUKOCYTE ESTERASE UR QL STRIP: NEGATIVE
NITRITE UR QL STRIP: NEGATIVE
PH UR STRIP: 7 [PH]
PROT UR QL STRIP: NEGATIVE
SP GR UR STRIP: <1.005
UROBILINOGEN UR STRIP-ACNC: NEGATIVE EU/DL

## 2025-04-29 PROCEDURE — 81003 URINALYSIS AUTO W/O SCOPE: CPT | Performed by: UROLOGY

## 2025-04-29 PROCEDURE — 74018 RADEX ABDOMEN 1 VIEW: CPT | Mod: 26,,, | Performed by: RADIOLOGY

## 2025-04-29 PROCEDURE — 74018 RADEX ABDOMEN 1 VIEW: CPT | Mod: TC

## 2025-04-29 PROCEDURE — 99204 OFFICE O/P NEW MOD 45 MIN: CPT | Mod: S$GLB,,, | Performed by: UROLOGY

## 2025-04-29 PROCEDURE — 1160F RVW MEDS BY RX/DR IN RCRD: CPT | Mod: CPTII,S$GLB,, | Performed by: UROLOGY

## 2025-04-29 PROCEDURE — 1159F MED LIST DOCD IN RCRD: CPT | Mod: CPTII,S$GLB,, | Performed by: UROLOGY

## 2025-04-29 PROCEDURE — 99999 PR PBB SHADOW E&M-EST. PATIENT-LVL IV: CPT | Mod: PBBFAC,,, | Performed by: UROLOGY

## 2025-04-29 PROCEDURE — 3078F DIAST BP <80 MM HG: CPT | Mod: CPTII,S$GLB,, | Performed by: UROLOGY

## 2025-04-29 PROCEDURE — 87086 URINE CULTURE/COLONY COUNT: CPT | Performed by: UROLOGY

## 2025-04-29 PROCEDURE — 3077F SYST BP >= 140 MM HG: CPT | Mod: CPTII,S$GLB,, | Performed by: UROLOGY

## 2025-04-29 RX ORDER — TAMSULOSIN HYDROCHLORIDE 0.4 MG/1
0.4 CAPSULE ORAL
Qty: 30 CAPSULE | Refills: 0 | Status: SHIPPED | OUTPATIENT
Start: 2025-04-29 | End: 2025-05-29

## 2025-04-29 NOTE — PROGRESS NOTES
Subjective:       Patient ID: Jake Wallace is a 54 y.o. male.    Chief Complaint:  kidney stones      History of Present Illness  HPI  Patient is a 54 y.o. male who is new to our clinic and referred by the ED, Dr. Pickering for evaluation of kidney stones.   History of Present Illness    Patient presents today for left-sided flank pain and groin pain with urinary symptoms He has experienced three episodes of left flank and groin pain. The first episode occurred on April 1st with severe left-sided pain radiating to the groin and testicles, lasting 90 minutes. The second episode on Easter Sunday was more severe, also lasting 90 minutes. The third and most recent episode occurred this past Sunday, primarily affecting the groin area, accompanied by urinary retention, and was the most severe of all episodes. Currently, he reports pain level of 3-4/10 localized to lower region with urinary urgency without ability to void. On Sunday afternoon, he experienced nausea and vomiting, and possible dehydration after playing tennis that morning. He denies chills. CT was performed during Emergency Room visit on Sunday night. He has been using a strainer at home and has collected three small stone fragments for analysis.           Review of Systems  Review of Systems  All other systems reviewed and negative except pertinent positives noted in HPI.       Objective:     Physical Exam  Constitutional:       General: He is not in acute distress.     Appearance: He is well-developed.   HENT:      Head: Normocephalic and atraumatic.   Eyes:      General: No scleral icterus.  Neck:      Trachea: No tracheal deviation.   Pulmonary:      Effort: Pulmonary effort is normal. No respiratory distress.   Neurological:      Mental Status: He is alert and oriented to person, place, and time.   Psychiatric:         Behavior: Behavior normal.         Thought Content: Thought content normal.         Judgment: Judgment normal.         Lab  Review  Lab Results   Component Value Date    COLORU Yellow 04/27/2025    SPECGRAV >=1.030 (A) 04/27/2025    PHUR 6.0 04/27/2025    NITRITE Negative 04/27/2025    UROBILINOGEN 2.0-3.0 (A) 04/27/2025         Assessment:        1. Ureteral stone with hydronephrosis            Plan:     Ureteral stone with hydronephrosis  -     Ambulatory referral/consult to Urology      Assessment & Plan    N20.1 Calculus of ureter  R33.8 Other retention of urine  R11.2 Nausea with vomiting, unspecified  R10.2 Pelvic and perineal pain    IMPRESSION:  - Patient presents with left-sided flank pain and urinary symptoms consistent with renal colic.  - CT Left Ureter reveals:  - 4.5 mm stone in distal left ureter  - Left-sided hydronephrosis and perinephric stranding  - Small stones in left kidney.  - Estimated 40-50% chance of spontaneous passage.  - Considered ureteroscopy with possible stent placement if stone does not pass.  - Weighing risks of intervention vs. conservative management, factoring in upcoming travel plans.  - Low suspicion for UTI based on urinalysis.    URETERAL CALCULUS:  - Explained CT findings, including location and size of ureteral stone.  - Discussed unpredictable nature of renal colic symptoms.  - Informed about ureteroscopy procedure, including potential need for stent placement.  - Patient to continue straining urine to check for stone passage.  - Recommend increasing water intake.  - Started Flomax (tamsulosin) 30 days to aid in stone passage.  - Ordered XR Left Ureter to establish baseline for stone location.  - Explained Flomax as off-label treatment to assist stone passage.    URINARY RETENTION:  - Ordered urine culture to check for infection.    PELVIC AND PERINEAL PAIN:  - Educated on risks of treating kidney stones with active infection.         I have explained the indication, risks, benefits, and alternatives of the procedure in detail.  Risks including but not limited to bleeding, infection, injury to  the urethra, bladder, ureter, need for additional treatments, inability or incomplete removal of kidney stones, pain, and discomfort related to the stent were discussed in depth with the patient.  The patient voices understanding and all questions have been answered.  The patient agrees to proceed as planned with left ureteroscopy, laser lithotripsy, and ureteral stent placement---plan for Monday 5/5/25.  Will cancel if patient passes stone.

## 2025-04-30 ENCOUNTER — PATIENT MESSAGE (OUTPATIENT)
Dept: UROLOGY | Facility: CLINIC | Age: 55
End: 2025-04-30
Payer: COMMERCIAL

## 2025-05-01 LAB — BACTERIA UR CULT: NO GROWTH

## 2025-05-02 ENCOUNTER — TELEPHONE (OUTPATIENT)
Dept: UROLOGY | Facility: CLINIC | Age: 55
End: 2025-05-02
Payer: COMMERCIAL

## 2025-05-05 ENCOUNTER — TELEPHONE (OUTPATIENT)
Dept: UROLOGY | Facility: CLINIC | Age: 55
End: 2025-05-05
Payer: COMMERCIAL

## 2025-05-05 ENCOUNTER — ANESTHESIA EVENT (OUTPATIENT)
Dept: SURGERY | Facility: HOSPITAL | Age: 55
End: 2025-05-05
Payer: COMMERCIAL

## 2025-05-05 ENCOUNTER — ANESTHESIA (OUTPATIENT)
Dept: SURGERY | Facility: HOSPITAL | Age: 55
End: 2025-05-05
Payer: COMMERCIAL

## 2025-05-05 ENCOUNTER — HOSPITAL ENCOUNTER (OUTPATIENT)
Facility: HOSPITAL | Age: 55
Discharge: HOME | End: 2025-05-05
Attending: UROLOGY | Admitting: UROLOGY
Payer: COMMERCIAL

## 2025-05-05 VITALS
TEMPERATURE: 98 F | HEART RATE: 50 BPM | SYSTOLIC BLOOD PRESSURE: 128 MMHG | DIASTOLIC BLOOD PRESSURE: 106 MMHG | OXYGEN SATURATION: 99 % | RESPIRATION RATE: 20 BRPM

## 2025-05-05 DIAGNOSIS — N20.9 UROLITHIASIS: ICD-10-CM

## 2025-05-05 DIAGNOSIS — N13.2 URETERAL STONE WITH HYDRONEPHROSIS: Primary | ICD-10-CM

## 2025-05-05 PROCEDURE — 63600175 PHARM REV CODE 636 W HCPCS: Performed by: NURSE ANESTHETIST, CERTIFIED REGISTERED

## 2025-05-05 PROCEDURE — 25000003 PHARM REV CODE 250: Performed by: NURSE ANESTHETIST, CERTIFIED REGISTERED

## 2025-05-05 PROCEDURE — 63600175 PHARM REV CODE 636 W HCPCS

## 2025-05-05 PROCEDURE — 82365 CALCULUS SPECTROSCOPY: CPT | Performed by: UROLOGY

## 2025-05-05 PROCEDURE — C1769 GUIDE WIRE: HCPCS | Performed by: UROLOGY

## 2025-05-05 PROCEDURE — 25000003 PHARM REV CODE 250

## 2025-05-05 PROCEDURE — 37000009 HC ANESTHESIA EA ADD 15 MINS: Performed by: UROLOGY

## 2025-05-05 PROCEDURE — 52352 CYSTOURETERO W/STONE REMOVE: CPT | Mod: LT,,, | Performed by: UROLOGY

## 2025-05-05 PROCEDURE — 71000015 HC POSTOP RECOV 1ST HR: Performed by: UROLOGY

## 2025-05-05 PROCEDURE — 37000008 HC ANESTHESIA 1ST 15 MINUTES: Performed by: UROLOGY

## 2025-05-05 PROCEDURE — 25000003 PHARM REV CODE 250: Performed by: UROLOGY

## 2025-05-05 PROCEDURE — 36000708 HC OR TIME LEV III 1ST 15 MIN: Performed by: UROLOGY

## 2025-05-05 PROCEDURE — C1758 CATHETER, URETERAL: HCPCS | Performed by: UROLOGY

## 2025-05-05 PROCEDURE — 36000709 HC OR TIME LEV III EA ADD 15 MIN: Performed by: UROLOGY

## 2025-05-05 PROCEDURE — 25000003 PHARM REV CODE 250: Performed by: STUDENT IN AN ORGANIZED HEALTH CARE EDUCATION/TRAINING PROGRAM

## 2025-05-05 PROCEDURE — 71000044 HC DOSC ROUTINE RECOVERY FIRST HOUR: Performed by: UROLOGY

## 2025-05-05 PROCEDURE — 27201423 OPTIME MED/SURG SUP & DEVICES STERILE SUPPLY: Performed by: UROLOGY

## 2025-05-05 PROCEDURE — 71000016 HC POSTOP RECOV ADDL HR: Performed by: UROLOGY

## 2025-05-05 PROCEDURE — D9220A PRA ANESTHESIA: Mod: CRNA,,, | Performed by: NURSE ANESTHETIST, CERTIFIED REGISTERED

## 2025-05-05 PROCEDURE — D9220A PRA ANESTHESIA: Mod: ANES,,, | Performed by: STUDENT IN AN ORGANIZED HEALTH CARE EDUCATION/TRAINING PROGRAM

## 2025-05-05 RX ORDER — HYDROCODONE BITARTRATE AND ACETAMINOPHEN 5; 325 MG/1; MG/1
1 TABLET ORAL EVERY 6 HOURS PRN
Qty: 3 TABLET | Refills: 0 | Status: SHIPPED | OUTPATIENT
Start: 2025-05-05

## 2025-05-05 RX ORDER — OXYBUTYNIN CHLORIDE 5 MG/1
5 TABLET ORAL ONCE
Status: COMPLETED | OUTPATIENT
Start: 2025-05-05 | End: 2025-05-05

## 2025-05-05 RX ORDER — SODIUM CHLORIDE 9 MG/ML
INJECTION, SOLUTION INTRAVENOUS CONTINUOUS PRN
Status: DISCONTINUED | OUTPATIENT
Start: 2025-05-05 | End: 2025-05-05

## 2025-05-05 RX ORDER — LIDOCAINE HYDROCHLORIDE 20 MG/ML
JELLY TOPICAL
Status: DISCONTINUED | OUTPATIENT
Start: 2025-05-05 | End: 2025-05-05 | Stop reason: HOSPADM

## 2025-05-05 RX ORDER — MIDAZOLAM HYDROCHLORIDE 1 MG/ML
INJECTION INTRAMUSCULAR; INTRAVENOUS
Status: DISCONTINUED | OUTPATIENT
Start: 2025-05-05 | End: 2025-05-05

## 2025-05-05 RX ORDER — TAMSULOSIN HYDROCHLORIDE 0.4 MG/1
0.4 CAPSULE ORAL DAILY
Qty: 30 CAPSULE | Refills: 0 | Status: SHIPPED | OUTPATIENT
Start: 2025-05-05 | End: 2025-06-04

## 2025-05-05 RX ORDER — OXYBUTYNIN CHLORIDE 5 MG/1
5 TABLET ORAL 3 TIMES DAILY PRN
Qty: 30 TABLET | Refills: 0 | Status: SHIPPED | OUTPATIENT
Start: 2025-05-05 | End: 2026-05-05

## 2025-05-05 RX ORDER — PHENAZOPYRIDINE HYDROCHLORIDE 100 MG/1
100 TABLET, FILM COATED ORAL
Qty: 30 TABLET | Refills: 0 | Status: SHIPPED | OUTPATIENT
Start: 2025-05-05 | End: 2025-05-15

## 2025-05-05 RX ORDER — PROPOFOL 10 MG/ML
VIAL (ML) INTRAVENOUS
Status: DISCONTINUED | OUTPATIENT
Start: 2025-05-05 | End: 2025-05-05

## 2025-05-05 RX ORDER — CEFAZOLIN 2 G/1
2 INJECTION, POWDER, FOR SOLUTION INTRAMUSCULAR; INTRAVENOUS
Status: COMPLETED | OUTPATIENT
Start: 2025-05-05 | End: 2025-05-05

## 2025-05-05 RX ORDER — KETOROLAC TROMETHAMINE 10 MG/1
10 TABLET, FILM COATED ORAL EVERY 6 HOURS
Qty: 20 TABLET | Refills: 0 | Status: SHIPPED | OUTPATIENT
Start: 2025-05-05 | End: 2025-05-10

## 2025-05-05 RX ORDER — FENTANYL CITRATE 50 UG/ML
25 INJECTION, SOLUTION INTRAMUSCULAR; INTRAVENOUS EVERY 5 MIN PRN
Status: DISCONTINUED | OUTPATIENT
Start: 2025-05-05 | End: 2025-05-05 | Stop reason: HOSPADM

## 2025-05-05 RX ORDER — LIDOCAINE HYDROCHLORIDE 20 MG/ML
INJECTION INTRAVENOUS
Status: DISCONTINUED | OUTPATIENT
Start: 2025-05-05 | End: 2025-05-05

## 2025-05-05 RX ORDER — HYDROMORPHONE HYDROCHLORIDE 1 MG/ML
0.2 INJECTION, SOLUTION INTRAMUSCULAR; INTRAVENOUS; SUBCUTANEOUS EVERY 5 MIN PRN
Status: DISCONTINUED | OUTPATIENT
Start: 2025-05-05 | End: 2025-05-05 | Stop reason: HOSPADM

## 2025-05-05 RX ORDER — OXYCODONE HYDROCHLORIDE 5 MG/1
5 TABLET ORAL
Status: DISCONTINUED | OUTPATIENT
Start: 2025-05-05 | End: 2025-05-05 | Stop reason: HOSPADM

## 2025-05-05 RX ORDER — SODIUM CHLORIDE 0.9 % (FLUSH) 0.9 %
10 SYRINGE (ML) INJECTION
Status: DISCONTINUED | OUTPATIENT
Start: 2025-05-05 | End: 2025-05-05 | Stop reason: HOSPADM

## 2025-05-05 RX ORDER — ONDANSETRON HYDROCHLORIDE 2 MG/ML
INJECTION, SOLUTION INTRAVENOUS
Status: DISCONTINUED | OUTPATIENT
Start: 2025-05-05 | End: 2025-05-05

## 2025-05-05 RX ORDER — PHENAZOPYRIDINE HYDROCHLORIDE 100 MG/1
100 TABLET, FILM COATED ORAL ONCE
Status: COMPLETED | OUTPATIENT
Start: 2025-05-05 | End: 2025-05-05

## 2025-05-05 RX ORDER — DEXAMETHASONE SODIUM PHOSPHATE 4 MG/ML
INJECTION, SOLUTION INTRA-ARTICULAR; INTRALESIONAL; INTRAMUSCULAR; INTRAVENOUS; SOFT TISSUE
Status: DISCONTINUED | OUTPATIENT
Start: 2025-05-05 | End: 2025-05-05

## 2025-05-05 RX ORDER — FENTANYL CITRATE 50 UG/ML
INJECTION, SOLUTION INTRAMUSCULAR; INTRAVENOUS
Status: DISCONTINUED | OUTPATIENT
Start: 2025-05-05 | End: 2025-05-05

## 2025-05-05 RX ORDER — GLUCAGON 1 MG
1 KIT INJECTION
Status: DISCONTINUED | OUTPATIENT
Start: 2025-05-05 | End: 2025-05-05 | Stop reason: HOSPADM

## 2025-05-05 RX ADMIN — CEFAZOLIN 2 G: 2 INJECTION, POWDER, FOR SOLUTION INTRAMUSCULAR; INTRAVENOUS at 09:05

## 2025-05-05 RX ADMIN — FENTANYL CITRATE 50 MCG: 50 INJECTION, SOLUTION INTRAMUSCULAR; INTRAVENOUS at 09:05

## 2025-05-05 RX ADMIN — LIDOCAINE HYDROCHLORIDE 100 MG: 20 INJECTION INTRAVENOUS at 09:05

## 2025-05-05 RX ADMIN — DEXAMETHASONE SODIUM PHOSPHATE 4 MG: 4 INJECTION, SOLUTION INTRAMUSCULAR; INTRAVENOUS at 09:05

## 2025-05-05 RX ADMIN — OXYBUTYNIN CHLORIDE 5 MG: 5 TABLET ORAL at 10:05

## 2025-05-05 RX ADMIN — PROPOFOL 200 MG: 10 INJECTION, EMULSION INTRAVENOUS at 09:05

## 2025-05-05 RX ADMIN — MIDAZOLAM HYDROCHLORIDE 2 MG: 2 INJECTION, SOLUTION INTRAMUSCULAR; INTRAVENOUS at 09:05

## 2025-05-05 RX ADMIN — PHENAZOPYRIDINE 100 MG: 100 TABLET ORAL at 10:05

## 2025-05-05 RX ADMIN — OXYCODONE 5 MG: 5 TABLET ORAL at 11:05

## 2025-05-05 RX ADMIN — SODIUM CHLORIDE: 0.9 INJECTION, SOLUTION INTRAVENOUS at 09:05

## 2025-05-05 RX ADMIN — ONDANSETRON 4 MG: 2 INJECTION INTRAMUSCULAR; INTRAVENOUS at 09:05

## 2025-05-05 NOTE — OP NOTE
[facility]  Operative Note    Date: 05/05/2025    Pre-Op Diagnosis: Left ureteral stone  Patient Active Problem List    Diagnosis Date Noted    Urolithiasis 05/05/2025    Left elbow pain 10/26/2023    Chronic left shoulder pain 10/26/2023    Status post cryoablation of arrhythmia (2013) 11/28/2022    MITRA (obstructive sleep apnea)     Paroxysmal atrial fibrillation     Generalized anxiety disorder     Pre-op testing 07/15/2020       Post-Op Diagnosis: Same    Procedure(s) Performed:   1.  Left ureteroscopy  2.  Cystoscopy  3.  Stone basket extraction  4.  Fluoro < 1 h    Specimen(s): Ureteral stone    Staff Surgeon: Gurinder Faust MD    Assistant Surgeon: Tucker Noland MD (TA) Alexei Roberts MD    Anesthesia: General LMA anesthesia    Indications: Jake Wallace is a 54 y.o. male with a left ureteral stone, presenting for definitive stone management.  He currently does not have a JJ ureteral stent in place.      Findings:   Distal left ureteral stone extracted without issue  Two non obstructing renal stones extracted without issue    1 baskets were used throughout the case.      Estimated Blood Loss: min    Drains: None    Procedure in detail:  After informed consent was obtained, the patient was brought the the cystoscopy suite and placed in the supine position.  SCDs were applied and working.  Anesthesia was administered.  The patient was then placed in the dorsal lithotomy position and prepped and draped in the usual sterile fashion.      A rigid cystoscope in a 22 Fr sheath was introduced into the patient's urethra.  This passed easily.  The entire urethra was visualized which showed no strictures or masses.  Formal cystoscopy was performed which revealed no masses or lesions suspicious for malignancy, no bladder stones, no bladder diverticuli, no trabeculations.  The ureteral orifices were visualized in the normal anatomic position bilaterally and clear efflux was visualized.      A  motion wire was passed up the left ureteral orifice and up into the kidney.  This passed easily and placement was confirmed using fluoro.  The cystoscope was removed keeping the guidewire in place and the wire was secured to the drape.      An 8 Fr rigid ureteroscope was passed into the patient's bladder alongside the wire under direct vision.  It was then passed through the left ureteral orifice alongside the wire.  A stone was encountered at the level of UVJ.  The stone was extracted using a basket and placed in the bladder.  The rigid ureteroscope was removed and a flexible ureteroscope was passed through the left ureteral orifice up into the left kidney.  Two small nonobstructing stones were encountered in the mid and lower poles.  These were removed using a basket and placed in the bladder.  The ureter was inspected and was found to be without perforation or injury.  Decision was made to forego ureteral stent placement and the wire was removed. A cystoscope was reinserted and the bladder was irrigated to remove the stone fragments.  The bladder was drained the cystoscope removed keeping the wire in place.      The patient tolerated the procedure well and was transferred to the recovery room in stable condition.      Disposition:  The patient will follow up with Dr. Faust in 12 weeks with a renal u/s prior.      Tucker Noland MD

## 2025-05-05 NOTE — BRIEF OP NOTE
[facility] - [facility] ([facility])  Brief Operative Note    Surgery Date: 5/5/2025     Surgeons and Role:     * Gurinder Faust MD - Primary     * Tucker Noland MD - Resident - Assisting     * Jose L Roberts MD - Resident - Assisting        Pre-op Diagnosis:  Ureteral stone with hydronephrosis [N13.2]    Post-op Diagnosis:  Post-Op Diagnosis Codes:     * Ureteral stone with hydronephrosis [N13.2]    Procedure(s) (LRB):  URETEROSCOPY (Left)  CYSTOSCOPY  REMOVAL, CALCULUS, URETER, URETEROSCOPIC (Left)  NEPHROSCOPY (Left)    Anesthesia: General    Operative Findings:   Distal left ureteral stone extracted without issue  Two non obstructing renal stones extracted without issue    Estimated Blood Loss: * No values recorded between 5/5/2025  9:10 AM and 5/5/2025  9:53 AM *         Specimens:   Specimen (24h ago, onward)      None            ID Type Source Tests Collected by Time Destination   A : left kidney/urteral stone Stone Kidney, Left URINARY STONE ANALYSIS Jose L Roberts MD 5/5/2025 0935            Discharge Note    OUTCOME: Patient tolerated treatment/procedure well without complication and is now ready for discharge.    DISPOSITION: Home or Self Care    FINAL DIAGNOSIS:  Urolithiasis    FOLLOWUP: In clinic    DISCHARGE INSTRUCTIONS:    Discharge Procedure Orders   US Kidney   Standing Status: Future Standing Exp. Date: 11/05/25     Order Specific Question Answer Comments   May the Radiologist modify the order per protocol to meet the clinical needs of the patient? Yes    Release to patient Immediate      Notify your health care provider if you experience any of the following:  temperature >100.4     Notify your health care provider if you experience any of the following:  persistent nausea and vomiting or diarrhea     Notify your health care provider if you experience any of the following:  severe uncontrolled pain     Notify your health care provider if you experience any of the following:  difficulty  breathing or increased cough     Notify your health care provider if you experience any of the following:  severe persistent headache     Notify your health care provider if you experience any of the following:  persistent dizziness, light-headedness, or visual disturbances     Notify your health care provider if you experience any of the following:  increased confusion or weakness

## 2025-05-05 NOTE — TELEPHONE ENCOUNTER
Spoke with patient again.  Patient reports he was just able to urinate and now has pain relief after passing what he believes was a fragment of a stone.  Patient instructed to call back if pain becomes intolerable despite medication.  Patient verbalized understanding.

## 2025-05-05 NOTE — DISCHARGE INSTRUCTIONS
Postoperative Instructions for Stone Surgery    1. Ureteral Stent:     - You have a ureteral stent in place. You will be contacted for a separate office appointment to have this stent removed. It is typically removed in a short office-based procedure by placing a small camera into the bladder and grasping the stent to remove it. This procedure typically lasts 1-2 minutes.    2. Hydration and Fluid Intake:     - It is essential to drink plenty of fluids following the ureteroscopy procedure to promote flushing of the urinary system and prevent dehydration.     - Aim to drink at least 8 to 10 glasses of water or other non-caffeinated, non-alcoholic beverages daily, unless instructed otherwise by your healthcare provider.    3. Pain Management:     - You may experience mild to moderate discomfort or pain after the ureteroscopy procedure. This is usually manageable with over-the-counter pain relievers such as acetaminophen or nonsteroidal anti-inflammatory drugs (NSAIDs).     - You may experience mild bladder and flank discomfort especially when voiding due to your ureteral stent. This may be alleviated with medications that were prescribed to you such as oxybutynin and/or pyridium. You may take these as needed for any urinary discomfort while a stent. Please note this discomfort is temporary and should subside once the stent is removed.     - If prescribed pain medication, take it as instructed, following the prescribed dosage and frequency.    4. Urinary Symptoms:     - It is common to experience some urinary symptoms after the procedure, such as urgency, frequency, burning sensation, or blood in the urine (hematuria). These symptoms should gradually improve within a few days.     - If the symptoms worsen or if you notice significant blood clots or inability to urinate, contact your healthcare provider.    5. Activity and Rest:     - It is advisable to take it easy and get plenty of rest for the first few days  following the procedure. Avoid strenuous activities, heavy lifting, or vigorous exercise for 1-2 days.     - Gradually resume normal activities as you feel comfortable, but listen to your body and avoid overexertion.    6. Diet and Nutrition:     - Follow any dietary recommendations provided by your healthcare provider. In general, maintain a balanced diet with adequate fiber intake to prevent constipation.     - Avoid excessive consumption of salt, spicy foods, and caffeinated or carbonated beverages, as these may irritate the urinary system.    7. Follow-up Appointments:     - Attend all scheduled follow-up appointments with your healthcare provider to monitor your recovery and assess the success of the stone removal.     - You will be contacted via phone or the patient portal about any follow up appointments and tests/imaging in about 1-2 weeks.     - Additional imaging or laboratory tests may be ordered to evaluate the effectiveness of the procedure.    8. Signs of Complications:     - Be aware of potential complications and contact your healthcare provider if you experience any of the following: persistent or worsening pain, fever, chills, excessive fatigue, severe bleeding, inability to pass urine, or signs of infection.    9. Medication Compliance:     - If prescribed any medications, such as antibiotics or medications to prevent stone recurrence, take them as instructed by your healthcare provider. Follow the recommended dosage and complete the full course of medication.    If you have any additional questions, call [phone] and ask for your provider. If after hours (night or weekends) ask for urology on call and you will be directed to the appropriate provider.

## 2025-05-05 NOTE — ANESTHESIA POSTPROCEDURE EVALUATION
Anesthesia Post Evaluation    Patient: Jake Wallace    Procedure(s) Performed: Procedure(s) (LRB):  URETEROSCOPY (Left)  CYSTOSCOPY  REMOVAL, CALCULUS, URETER, URETEROSCOPIC (Left)  NEPHROSCOPY (Left)    Final Anesthesia Type: general      Patient location during evaluation: [facility]  Patient participation: Yes- Able to Participate  Level of consciousness: awake and alert and oriented  Post-procedure vital signs: reviewed and stable  Pain management: adequate  Airway patency: patent    PONV status at discharge: No PONV  Anesthetic complications: no      Cardiovascular status: blood pressure returned to baseline  Respiratory status: unassisted and spontaneous ventilation  Hydration status: euvolemic  Follow-up not needed.          Vitals Value Taken Time   /80 05/05/25 10:46   Temp 36.4 °C (97.5 °F) 05/05/25 10:00   Pulse 57 05/05/25 10:46   Resp 24 05/05/25 10:46   SpO2 100 % 05/05/25 10:46   Vitals shown include unfiled device data.      No case tracking events are documented in the log.      Pain/Jorje Score: Jorje Score: 10 (5/5/2025 10:30 AM)

## 2025-05-05 NOTE — PLAN OF CARE
Patient tolerating oral liquids without difficulty. No apparent s&s of distress noted at this time, no complaints voiced at this time. Discharge instructions reviewed with patient and spouse with good verbal feedback received. Patient ready for discharge

## 2025-05-05 NOTE — OP NOTE
[facility]  Operative Note    Date: 05/05/2025    Pre-Op Diagnosis: left ureteral and kidney stone    Patient Active Problem List    Diagnosis Date Noted    Urolithiasis 05/05/2025    Left elbow pain 10/26/2023    Chronic left shoulder pain 10/26/2023    Status post cryoablation of arrhythmia (2013) 11/28/2022    MITRA (obstructive sleep apnea)     Paroxysmal atrial fibrillation     Generalized anxiety disorder     Pre-op testing 07/15/2020       Post-Op Diagnosis: same    Procedure(s) Performed:   1. Left ureteroscopy with stone extraction  2. Cystoscopy  3. Left pyeloscopy  4. Basket stone extraction  5. Fluoro < 1 hr    Specimen(s): stone for analysis     Staff Surgeon: Gurinder Faust MD    Assistant Surgeon: Jose L Roberts MD, Tucker Noland MD    Anesthesia: General LMA anesthesia    Indications: Jake Wallace is a 54 y.o. male with a left ureteral stone presenting for definitive stone management. He is not pre-stented.    Findings:  1. Stones encountered in distal ureter removed with basket  2. Two small stones in kidney removed with basket  3. Ureter appeared healthy at end of case, decision made not to leave stent     Estimated Blood Loss: min    Drains:   1. none    Procedure in detail:  After risks, benefits, and possible complications were explained, the patient elected to undergo the procedure and informed consent was obtained. All questions were answered in the eda-operative area. The patient was transferred to the cystoscopy suite and placed in the supine position. SCDs were applied and working. Anesthesia was administered. The patient was then placed in the dorsal lithotomy position and prepped and draped in the usual sterile fashion. Time out was performed, and eda-procedural antibiotics were confirmed.     A rigid cystoscope in a 22 Fr sheath was introduced into the patient's urethra. This passed easily. The entire urethra was visualized which showed no strictures or  masses. Cystoscopy revealed the ureteral orifices in the normal anatomic location bilaterally.    A motion wire was passed up the left ureteral orifice and up into the kidney. This passed easily and placement was confirmed fluoroscopically. The cystoscope was removed keeping the wire in place.    An 8 Fr rigid ureteroscope was passed into the patient's bladder alongside the wire under direct vision. It was then passed through the left ureteral orifice alongside the wire. A stone was encountered at the level of the distal ureter.  The stone was easily extracted with an NCircle nitinol basket. The ureteroscope was reinserted and the entire length of the ureter was surveyed and no additional stone fragments were visualized.   .  An 8 Fr flexible ureteroscope was then passed alongside the motion wire into the kidney.  Stones were encountered in lower pole. The stones were removed with a basket and placed into the bladder. The ureter was surveyed while exiting the kidney and appeared healthy, no signs of trauma or inflammation, decision was made not to leave a stent.     The cystoscope was reinserted and the bladder was irrigated to remove the stone fragments. The bladder was drained and the cystoscope removed keeping the wire in place.      The patient tolerated the procedure well and was transferred to the recovery room in stable condition.    Disposition:  The patient will be discharged home from PACU. He follow up with Dr. Faust.     Jose L Roberts MD     I have reviewed the operative note performed by Dr. Roberts, and I concur with her/his documentation of Jake Wallace. I was present for the critical or key portions of the procedure.

## 2025-05-05 NOTE — ANESTHESIA PREPROCEDURE EVALUATION
[facility]  Anesthesia Pre-Operative Evaluation     Patient Name: Jake Wallace  YOB: 1970  MRN: [id number]  [facility]: [id number]       Admit Date: 5/5/2025   Admit Team: Networked reference to record PCT   Hospital Day: 1  Date of Procedure: 5/5/2025  Anesthesia: General Procedure: Procedure(s) (LRB):  CYSTOURETEROSCOPY, WITH HOLMIUM LASER LITHOTRIPSY OF URETERAL CALCULUS AND STENT INSERTION (Left)  Pre-Operative Diagnosis: Ureteral stone with hydronephrosis [N13.2]  Proceduralist:Surgeons and Role:     * Gurinder Faust MD - Primary  Code Status: Prior   Advanced Directive: <no information>  Isolation Precautions: No active isolations  Capacity: Full capacity     SUBJECTIVE:   Jake Wallace is a 54 y.o. male with below PMH presenting for above procedure(s).    Past Medical History:   Diagnosis Date    Atrial fibrillation     Basal cell carcinoma     Generalized anxiety disorder     Hypercholesteremia     Hypogonadism in male     Vitamin D deficiency         No notes on file    Hospital LOS: 0 days  ICU LOS: Patient does not have an ICU stay during this admission.    ALLERGIES:     Review of patient's allergies indicates:   Allergen Reactions    Penicillins      LDA:     Lines/Drains/Airways       None                  Anesthesia Evaluation     Patient summary reviewed    No history of anesthetic complications   Airway   Mallampati: II  TM distance: Normal  Neck ROM: Normal ROM  Dental    (+) Intact    Pulmonary    (+) sleep apnea  (-) COPD, asthma  Cardiovascular   Exercise tolerance: good  (+) dysrhythmias (pAF)  (-) past MI, CAD, angina, PARRA    Neuro/Psych    (+) psychiatric history  (-) TIA, CVA    GI/Hepatic/Renal    (-) GERD, liver disease    Endo/Other    (-) diabetes mellitus  Abdominal                  MEDICATIONS:     Current Outpatient Medications on File Prior to Encounter   Medication Sig Dispense Refill Last Dose/Taking    acetaminophen (TYLENOL) 500  MG tablet Take 2 tablets (1,000 mg total) by mouth 3 (three) times daily as needed for Pain. 30 tablet 0     clindamycin (CLEOCIN T) 1 % external solution Aaa bumps of scalp or inflammed follicles when flaring 30 mL 5     ELIQUIS 5 mg Tab Take 5 mg by mouth 2 (two) times daily.       ibuprofen (ADVIL,MOTRIN) 200 MG tablet Take 3 tablets (600 mg total) by mouth every 6 (six) hours as needed for Pain. 30 tablet 0     ketoconazole (NIZORAL) 2 % shampoo Wash hair with medicated shampoo at least 2x/week - let sit on scalp at least 5 minutes prior to rinsing 120 mL 5     LORazepam (ATIVAN) 0.5 MG tablet lorazepam 0.5 mg tablet   TAKE 1 TABLET BY MOUTH TWICE A DAY AS NEEDED FOR ANXIETY       metoprolol tartrate (LOPRESSOR) 50 MG tablet Take 50 mg by mouth 2 (two) times daily.       ondansetron (ZOFRAN) 4 MG tablet Take 1 tablet (4 mg total) by mouth 3 (three) times daily as needed for Nausea. 20 tablet 0     tamsulosin (FLOMAX) 0.4 mg Cap Take 1 capsule (0.4 mg total) by mouth after dinner. 30 capsule 0     testosterone (ANDROGEL) 1 % (50 mg/5 gram) GlPk testosterone 50 mg/5 gram (1 %) transdermal gel   APPLY CONTENTS OF ONE PACKET TO SKIN DAILY AS DIRECTED       valACYclovir (VALTREX) 1000 MG tablet valacyclovir 1 gram tablet         Inpatient Medications:  Antibiotics (From admission, onward)      None          VTE Risk Mitigation (From admission, onward)      None              Current Medications[1]       History:   There are no hospital problems to display for this patient.    Surgical History:    has a past surgical history that includes Colonoscopy (N/A, 7/15/2020).   Social History:    reports being sexually active and has had partner(s) who are female.  reports that he has never smoked. He has never used smokeless tobacco. He reports current alcohol use of about 14.0 standard drinks of alcohol per week. He reports that he does not use drugs.    There were no vitals filed for this visit.  Vital Signs Range  "(Last 24H):       There is no height or weight on file to calculate BMI.  Wt Readings from Last 4 Encounters:   04/27/25 90.7 kg (200 lb)   04/08/24 93 kg (205 lb 0.4 oz)   11/16/23 93.8 kg (206 lb 12.7 oz)   10/05/23 93 kg (205 lb 0.4 oz)        Intake/Output - Last 3 Shifts       None          Lab Results   Component Value Date    WBC 10.18 04/27/2025    HGB 15.0 04/27/2025    HCT 43 04/27/2025     04/27/2025     04/27/2025    K 3.9 04/27/2025     04/27/2025    CREATININE 1.1 04/27/2025    BUN 13 04/27/2025    CO2 23 04/27/2025    GLU 92 04/27/2025    CALCIUM 9.5 04/27/2025    ALKPHOS 57 04/27/2025    ALT 14 04/27/2025    AST 21 04/27/2025    ALBUMIN 4.0 04/27/2025     No results found for this or any previous visit (from the past 12 hours).  No results for input(s): "WBC", "HGB", "HCT", "PLT", "NA", "K", "CREATININE", "GLU", "INR", "LACTATE", "5HIAAPLASMA", "5HIAAURINT", "5HIAA", "1EVRA15NM" in the last 168 hours.  No LMP for male patient.    EKG:   No results found for this or any previous visit.    Pre-op Assessment    I have reviewed the Patient Summary Reports.    I have reviewed the NPO Status.      Review of Systems  Anesthesia Hx:   Denies Hx of Anesthetic complications              Denies Personal Hx of Anesthesia complications.                    Cardiovascular:  Exercise tolerance: good      Denies MI.  Denies CAD.    Dysrhythmias (pAF)   Denies Angina.       Denies PARRA.                          Atrial Fibrillation     Pulmonary:    Denies COPD.  Denies Asthma.    Sleep Apnea     Obstructive Sleep Apnea (MITRA).           Hepatic/GI:      Denies GERD. Denies Liver Disease.               Neurological:  Denies TIA.  Denies CVA.                                    Endocrine:  Denies Diabetes.           Psych:  Psychiatric History                Physical Exam  General: Well nourished, Cooperative, Alert and Oriented    Airway:  Mallampati: II   Mouth Opening: Normal  TM Distance: " Normal  Tongue: Normal  Neck ROM: Normal ROM    Dental:  Intact      Anesthesia Plan  Type of Anesthesia, risks & benefits discussed:    Anesthesia Type: Gen ETT  Intra-op Monitoring Plan: Standard ASA Monitors  Post Op Pain Control Plan: multimodal analgesia  Induction:  IV  Airway Plan: Video, Post-Induction  Informed Consent: Informed consent signed with the Patient and all parties understand the risks and agree with anesthesia plan.  All questions answered.   ASA Score: 2  Day of Surgery Review of History & Physical: H&P Update referred to the surgeon/provider.    Ready For Surgery From Anesthesia Perspective.   .             [1]  No current facility-administered medications for this encounter.

## 2025-05-05 NOTE — BRIEF OP NOTE
[facility] - Surgery ([facility])  Brief Operative Note    Surgery Date: 5/5/2025     Surgeons and Role:     * Gurinder Faust MD - Primary     * Tucker Noland MD - Resident - Assisting     * Jose L Roberts MD - Resident - Assisting        Pre-op Diagnosis:  Ureteral stone with hydronephrosis [N13.2]    Post-op Diagnosis:  Post-Op Diagnosis Codes:     * Ureteral stone with hydronephrosis [N13.2]    Procedure(s) (LRB):  URETEROSCOPY (Left)  CYSTOSCOPY  REMOVAL, CALCULUS, URETER, URETEROSCOPIC (Left)  NEPHROSCOPY (Left)    Anesthesia: General    Operative Findings: Procedures as stated above successfully performed. No complications.       Estimated Blood Loss: min         Specimens:   Specimen (24h ago, onward)      None            ID Type Source Tests Collected by Time Destination   A : left kidney/urteral stone Stone Kidney, Left URINARY STONE ANALYSIS Jose L Roberts MD 5/5/2025 0935            Discharge Note    OUTCOME: Patient tolerated treatment/procedure well without complication and is now ready for discharge.    DISPOSITION: Home or Self Care    FINAL DIAGNOSIS:  Urolithiasis    FOLLOWUP: In clinic    DISCHARGE INSTRUCTIONS:  patient can be discharged home

## 2025-05-05 NOTE — TRANSFER OF CARE
Anesthesia Transfer of Care Note    Patient: Jake Wallace    Procedure(s) Performed: Procedure(s) (LRB):  URETEROSCOPY (Left)  CYSTOSCOPY  REMOVAL, CALCULUS, URETER, URETEROSCOPIC (Left)  NEPHROSCOPY (Left)    Patient location: PACU    Anesthesia Type: general    Transport from OR: Transported from OR on 6-10 L/min O2 by face mask with adequate spontaneous ventilation    Post pain: adequate analgesia    Post assessment: no apparent anesthetic complications and tolerated procedure well    Post vital signs: stable    Level of consciousness: awake and alert    Nausea/Vomiting: no nausea/vomiting    Complications: none    Transfer of care protocol was followed    Last vitals: Visit Vitals  /67   Pulse (!) 52   Temp 36.3 °C (97.3 °F) (Temporal)   Resp 18   SpO2 100%

## 2025-05-08 LAB
CELL MATERIAL STONE EST-MCNT: NORMAL %
LABORATORY COMMENT REPORT: NORMAL
SPECIMEN SOURCE: NORMAL

## 2025-05-21 RX ORDER — TAMSULOSIN HYDROCHLORIDE 0.4 MG/1
0.4 CAPSULE ORAL
Qty: 90 CAPSULE | Refills: 3 | Status: SHIPPED | OUTPATIENT
Start: 2025-05-21 | End: 2025-06-20

## 2025-05-28 ENCOUNTER — OFFICE VISIT (OUTPATIENT)
Dept: ORTHOPEDICS | Facility: CLINIC | Age: 55
End: 2025-05-28
Payer: COMMERCIAL

## 2025-05-28 ENCOUNTER — HOSPITAL ENCOUNTER (OUTPATIENT)
Facility: HOSPITAL | Age: 55
Discharge: HOME | End: 2025-05-28
Attending: ORTHOPAEDIC SURGERY
Payer: COMMERCIAL

## 2025-05-28 VITALS — WEIGHT: 201.94 LBS | HEIGHT: 72 IN | BODY MASS INDEX: 27.35 KG/M2

## 2025-05-28 DIAGNOSIS — S93.412A SPRAIN OF CALCANEOFIBULAR LIGAMENT OF LEFT ANKLE, INITIAL ENCOUNTER: ICD-10-CM

## 2025-05-28 DIAGNOSIS — M25.572 LEFT ANKLE PAIN, UNSPECIFIED CHRONICITY: Primary | ICD-10-CM

## 2025-05-28 DIAGNOSIS — S93.492A SPRAIN OF ANTERIOR TALOFIBULAR LIGAMENT OF LEFT ANKLE, INITIAL ENCOUNTER: Primary | ICD-10-CM

## 2025-05-28 DIAGNOSIS — M25.572 LEFT ANKLE PAIN, UNSPECIFIED CHRONICITY: ICD-10-CM

## 2025-05-28 PROCEDURE — 73610 X-RAY EXAM OF ANKLE: CPT | Mod: 26,LT,, | Performed by: RADIOLOGY

## 2025-05-28 PROCEDURE — 99203 OFFICE O/P NEW LOW 30 MIN: CPT | Mod: S$GLB,,, | Performed by: ORTHOPAEDIC SURGERY

## 2025-05-28 PROCEDURE — 3008F BODY MASS INDEX DOCD: CPT | Mod: CPTII,S$GLB,, | Performed by: ORTHOPAEDIC SURGERY

## 2025-05-28 PROCEDURE — 1159F MED LIST DOCD IN RCRD: CPT | Mod: CPTII,S$GLB,, | Performed by: ORTHOPAEDIC SURGERY

## 2025-05-28 PROCEDURE — 99999 PR PBB SHADOW E&M-EST. PATIENT-LVL III: CPT | Mod: PBBFAC,,, | Performed by: ORTHOPAEDIC SURGERY

## 2025-05-28 PROCEDURE — 73610 X-RAY EXAM OF ANKLE: CPT | Mod: TC,PN,LT

## 2025-05-28 RX ORDER — LEG BRACE
1 EACH MISCELLANEOUS DAILY
Qty: 1 EACH | Refills: 0 | Status: SHIPPED | OUTPATIENT
Start: 2025-05-28

## 2025-05-28 NOTE — PROGRESS NOTES
[facility], Orthopedics and Sports Medicine  [facility]    New Patient Office Visit  05/28/2025     Subjective:      Jake Wallace is a 54 y.o. male referred by No ref. provider found for evaluation and treatment of left ankle pain..  This is evaluated as a personal injury.   The patient has the following symptoms: pain located anterior lateral ankle left side.  The symptoms began 1 day ago and are unchanged.  Injured while playing tennis.     Frequent .     Works as .     Outside reports reviewed: historical medical records, office notes, and radiology reports.    Past Medical History:   Diagnosis Date    Atrial fibrillation     Basal cell carcinoma     Generalized anxiety disorder     Hypercholesteremia     Hypogonadism in male     Vitamin D deficiency        Problem List[1]    Past Surgical History:   Procedure Laterality Date    COLONOSCOPY N/A 7/15/2020    Procedure: COLONOSCOPY;  Surgeon: Nitin Raymond MD;  Location: [facility];  Service: Endoscopy;  Laterality: N/A;    CYSTOSCOPY  5/5/2025    Procedure: CYSTOSCOPY;  Surgeon: Gurinder Faust MD;  Location: [address];  Service: Urology;;    NEPHROSCOPY Left 5/5/2025    Procedure: NEPHROSCOPY;  Surgeon: Gurinder Faust MD;  Location: [address];  Service: Urology;  Laterality: Left;    URETEROSCOPIC REMOVAL OF URETERIC CALCULUS Left 5/5/2025    Procedure: REMOVAL, CALCULUS, URETER, URETEROSCOPIC;  Surgeon: Gurinder Faust MD;  Location: [address];  Service: Urology;  Laterality: Left;  STONE EXTRACTION URETER/KIDNEY    URETEROSCOPY Left 5/5/2025    Procedure: URETEROSCOPY;  Surgeon: Gurinder Faust MD;  Location: [address];  Service: Urology;  Laterality: Left;        Current Outpatient Medications   Medication Instructions    acetaminophen (TYLENOL) 1,000 mg, Oral, 3 times daily PRN    clindamycin (CLEOCIN T) 1 % external solution Aaa bumps of scalp or inflammed  follicles when flaring    ELIQUIS 5 mg, 2 times daily    HYDROcodone-acetaminophen (NORCO) 5-325 mg per tablet 1 tablet, Oral, Every 6 hours PRN    ibuprofen (ADVIL,MOTRIN) 600 mg, Oral, Every 6 hours PRN    ketoconazole (NIZORAL) 2 % shampoo Wash hair with medicated shampoo at least 2x/week - let sit on scalp at least 5 minutes prior to rinsing    leg brace (ANKLE BRACE) Misc 1 Units, Misc.(Non-Drug; Combo Route), Daily    LORazepam (ATIVAN) 0.5 MG tablet lorazepam 0.5 mg tablet   TAKE 1 TABLET BY MOUTH TWICE A DAY AS NEEDED FOR ANXIETY    metoprolol tartrate (LOPRESSOR) 50 mg, 2 times daily    ondansetron (ZOFRAN) 4 mg, Oral, 3 times daily PRN    oxybutynin (DITROPAN) 5 mg, Oral, 3 times daily PRN    tamsulosin (FLOMAX) 0.4 mg, Oral, Daily    tamsulosin (FLOMAX) 0.4 mg, Oral, After dinner    testosterone (ANDROGEL) 1 % (50 mg/5 gram) GlPk testosterone 50 mg/5 gram (1 %) transdermal gel   APPLY CONTENTS OF ONE PACKET TO SKIN DAILY AS DIRECTED    valACYclovir (VALTREX) 1000 MG tablet valacyclovir 1 gram tablet        Review of patient's allergies indicates:   Allergen Reactions    Penicillins        Social History[2]    Family History   Problem Relation Name Age of Onset    Melanoma Neg Hx           Review of Systems   Constitutional: Negative for chills and fever.   HENT:  Negative for hearing loss.    Eyes:  Negative for blurred vision.   Cardiovascular:  Negative for chest pain.   Respiratory:  Negative for shortness of breath.    Gastrointestinal:  Negative for abdominal pain.   Neurological:  Negative for light-headedness.          Objective:      General    Vitals reviewed.  Constitutional: He is oriented to person, place, and time. He appears well-developed and well-nourished. No distress.   HENT: Mouth/Throat: No oropharyngeal exudate.   Eyes: Right eye exhibits no discharge. Left eye exhibits no discharge.   Cardiovascular:  Normal rate.            Pulmonary/Chest: Breath sounds normal. No respiratory  distress.   Neurological: He is alert and oriented to person, place, and time. He has normal reflexes. No cranial nerve deficit. Coordination normal.   Psychiatric: He has a normal mood and affect. His behavior is normal. Judgment and thought content normal.     General Musculoskeletal Exam   Gait: normal     Right Ankle/Foot Exam     Inspection   Scars: absent  Deformity: absent  Erythema: absent  Bruising: Ankle - absent Foot - absent  Effusion: Ankle - absent Foot - absent  Atrophy: Ankle - absent Foot - absent    Range of Motion   Ankle Joint   Dorsiflexion:  10 normal   Plantar flexion:  35 normal   Subtalar Joint   Inversion:  15 normal   Eversion:  5 normal   Willis Test:  negative  First MTP Joint: normal    Alignment   Knee Alignment: neutral  Hindfoot Alignment: neutral  Midfoot Alignment: normal  Forefoot Alignment: normal    Tests   Anterior drawer: 1+  Varus tilt: 1+  Heel Walk: able to perform  Tiptoe Walk: able to perform  Single Heel Rise: able to perform  External Rotation Test: negative  Squeeze Test: negative    Other   Ankle Crepitus: absent  Foot Crepitus:  absent  Sensation: normal  Peroneal Subluxation: negative    Left Ankle/Foot Exam     Inspection  Deformity: absent  Erythema: absent  Bruising: Ankle - absent Foot - absent  Effusion: Ankle - absent Foot - absent  Atrophy: Ankle - absent Foot - absent  Scars: absent    Tenderness   The patient is tender to palpation of the ATF and CF ligament.    Range of Motion   Ankle Joint  Dorsiflexion:  abnormal   Plantar flexion:  abnormal     Subtalar Joint   Inversion:  abnormal   Eversion:  abnormal   Willis Test:  normal  First MTP Joint: normal    Alignment   Knee Alignment: neutral  Hindfoot Alignment: neutral  Midfoot Alignment: normal  Forefoot Alignment: normal    Tests   Anterior drawer: 1+  Varus tilt: 1+  Heel Walk: able to perform  Tiptoe Walk: able to perform  Single Heel Rise: able to perform  External Rotation Test:  negative  Squeeze Test: absent    Other   Foot Crepitus:  absent  Ankle Crepitus: absent  Sensation: normal  Peroneal Subluxation: negative    Comments:  Strength and motion not tested due to known injury       Muscle Strength   Right Lower Extremity   Anterior tibial:  5/5   Posterior tibial:  5/5   Gastrocsoleus:  5/5   Peroneal muscle:  5/5   EHL:  5/5  FDL: 5/5  EDL: 5/5  FHL: 5/5  Left Lower Extremity   Anterior tibial:  5/5   Posterior tibial:  5/5   Gastrocsoleus:  5/5   Peroneal muscle:  5/5   EHL:  5/5  FDL: 5/5  EDL: 5/5  FHL: 5/5    Reflexes     Left Side  Achilles:  2+  Post Tibial:  2+  Plantar Reflex: 2+    Right Side   Achilles:  2+  Post Tibial:  2+  Plantar Reflex: 2+    Vascular Exam     Right Pulses  Dorsalis Pedis:      2+  Posterior Tibial:      2+        Left Pulses  Dorsalis Pedis:      2+  Posterior Tibial:      2+        Edema  Right Lower Leg: absent  Left Lower Leg: absent      Imaging:  Radiographs of the left ankle taken 05/28/2025 were personally reviewed from the [facility]MR. Multiple views of the left ankle are available today for review, including an AP, mortise, and lateral view.  The mortise joint demonstrates minimal degenerative changes.  The ankle mortise is symmetric with no medial clear space widening.     Procedures        Assessment:       Jake Wallace is a 54 y.o. male seen in the office today. The primary encounter diagnosis was Sprain of anterior talofibular ligament of left ankle, initial encounter. A diagnosis of Sprain of calcaneofibular ligament of left ankle, initial encounter was also pertinent to this visit.  Non-operative treatment is recommended at this time. Educated to rehab protocol and timeframe for recovery.  Advised against sports at this time. Offered boot but declined and instead gave ankle brace.  The natural history and expected course discussed with patient. Various treatment options were discussed, including their risks and benefits. All  of the patient's questions were answered.     Plan:      Physical therapy and rehabilitation treatment.  Tylenol 650mg TID, PRN pain.  Follow up in 6 weeks.   DME: ankle brace wear all times while ambulating           Ramakrishna Iyer IV, MD   of Clinical Orthopedics  Department of Orthopedic Surgery  [facility]  Office: [phone]  Website: [email or web address]      Orders Placed This Encounter    Ambulatory Referral/Consult to Physical Therapy    [place] [facility] [place] [facility] [place]   Patient Active Problem List  Diagnosis    Pre-op testing    Paroxysmal atrial fibrillation    Generalized anxiety disorder    MITRA (obstructive sleep apnea)    Status post cryoablation of arrhythmia (2013)    Left elbow pain    Chronic left shoulder pain    Urolithiasis   [2]   Social History  Socioeconomic History    Marital status:    Tobacco Use    Smoking status: Never    Smokeless tobacco: Never   Substance and Sexual Activity    Alcohol use: Yes     Alcohol/week: 14.0 standard drinks of alcohol     Types: 14 Shots of liquor per week     Comment: about 2 drinks per day    Drug use: Never    Sexual activity: Yes     Partners: Female     Social Drivers of Health     Financial Resource Strain: Low Risk  (4/28/2025)    Overall Financial Resource Strain (CARDIA)     Difficulty of Paying Living Expenses: Not hard at all   Food Insecurity: No Food Insecurity (4/28/2025)    Hunger Vital Sign     Worried About Running Out of Food in the Last Year: Never true     Ran Out of Food in the Last Year: Never true   Transportation Needs: No Transportation Needs (4/28/2025)    PRAPARE - Transportation     Lack of Transportation (Medical): No     Lack of Transportation (Non-Medical): No   Physical Activity: Sufficiently Active (4/28/2025)    Exercise Vital Sign     Days of Exercise per Week: 3 days     Minutes of Exercise per Session: 90 min   Stress: Stress Concern Present (4/28/2025)     [employer] [facility] [facility] [place] [employer] - Occupational Stress Questionnaire     Feeling of Stress : To some extent   Housing Stability: Low Risk  (4/28/2025)    Housing Stability Vital Sign     Unable to Pay for Housing in the Last Year: No     Number of Times Moved in the Last Year: 0     Homeless in the Last Year: No

## 2025-06-04 ENCOUNTER — CLINICAL SUPPORT (OUTPATIENT)
Dept: REHABILITATION | Facility: HOSPITAL | Age: 55
End: 2025-06-04
Payer: COMMERCIAL

## 2025-06-04 DIAGNOSIS — R29.898 DECREASED STRENGTH OF LOWER EXTREMITY: ICD-10-CM

## 2025-06-04 DIAGNOSIS — S93.492A SPRAIN OF ANTERIOR TALOFIBULAR LIGAMENT OF LEFT ANKLE, INITIAL ENCOUNTER: ICD-10-CM

## 2025-06-04 DIAGNOSIS — M25.673 DECREASED RANGE OF MOTION OF ANKLE, UNSPECIFIED LATERALITY: Primary | ICD-10-CM

## 2025-06-04 PROCEDURE — 97140 MANUAL THERAPY 1/> REGIONS: CPT

## 2025-06-04 PROCEDURE — 97161 PT EVAL LOW COMPLEX 20 MIN: CPT

## 2025-06-04 PROCEDURE — 97110 THERAPEUTIC EXERCISES: CPT

## 2025-06-04 NOTE — PROGRESS NOTES
Outpatient Rehab    Physical Therapy Evaluation    Patient Name: Jake Wallace  MRN: [id number]  YOB: 1970  Encounter Date: 6/4/2025    Therapy Diagnosis:   Encounter Diagnoses   Name Primary?    Sprain of anterior talofibular ligament of left ankle, initial encounter     Decreased range of motion of ankle, unspecified laterality Yes    Decreased strength of lower extremity      Physician: Ramakrishna Iyer IV, MD    Physician Orders: Eval and Treat  Medical Diagnosis: Sprain of anterior talofibular ligament of left ankle, initial encounter    Visit # / Visits Authorized:  1 / 1  Insurance Authorization Period: 5/28/2025 to 5/28/2026  Date of Evaluation: 6/4/2025  Plan of Care Certification: 6/4/2025 to 8/5/25     Time In:   2:00 PM  Time Out:  3:00 PM  Total Time (in minutes):   60  Total Billable Time (in minutes):  60    Intake Outcome Measure for FOTO Survey    Therapist reviewed FOTO scores for Jaek Wallace on 6/4/2025.   FOTO report - see Media section or FOTO account episode details.     Intake Score:  %    Precautions:       Subjective  L ankle pain that started 8 days ago after rolling his ankle while playing tennis. States he felt a pop when it happened and quit playing and put ice on it immediately. Next morning noticed increased swelling that has since improved.  Endorses within 48 hours an increase in swelling and bruising that extended down into his foot. States pain localized along lateral ankle and foot that has improved. Was seen by Ortho doc who prescribed lace up ankle brace that he wore for a couple days but discharged due to it being uncomfortable. No prior history of ankle sprains in the past but did break 5th metatarsal 30+ years ago. States pain is worse with prolonged standing/walking, walking on uneven surfaces. Was taking tylenol initially for pain relief but has since stopped. Active  typically playing 2x/week mostly on hard court that he has not  attempted to play. Walks dog for exercise.    Pain:   Current 1/10  Worst: 3/10  Best: 0/10    Goals: return to tennis    Jake Wallace is a 54 y.o. male referred by No ref. provider found for evaluation and treatment of left ankle pain..  This is evaluated as a personal injury.   The patient has the following symptoms: pain located anterior lateral ankle left side.  The symptoms began 1 day ago and are unchanged.  Injured while playing tennis.              Past Medical History/Physical Systems Review:   Jake Wallace  has a past medical history of Atrial fibrillation, Basal cell carcinoma, Generalized anxiety disorder, Hypercholesteremia, Hypogonadism in male, and Vitamin D deficiency.    Jake Wallace  has a past surgical history that includes Colonoscopy (N/A, 7/15/2020); Ureteroscopy (Left, 5/5/2025); Cystoscopy (5/5/2025); Ureteroscopic removal of ureteric calculus (Left, 5/5/2025); and Nephroscopy (Left, 5/5/2025).    Jake has a current medication list which includes the following prescription(s): acetaminophen, clindamycin, eliquis, hydrocodone-acetaminophen, ibuprofen, ketoconazole, ankle brace, lorazepam, metoprolol tartrate, ondansetron, oxybutynin, tamsulosin, testosterone, and valacyclovir.    Review of patient's allergies indicates:   Allergen Reactions    Penicillins         Objective    Posture: mild pes planus    Gait: min antalgic, decreased WB through LLE, early heel off    Functional Tests:   SLS EO: L:20s, R 30s  SLS EC: L10s, R 30s  OH Squat: early heel off B  Single leg squat: R n/t ; L n/t  Single leg calf raise: R good height ; L decreased heel height      Ankle Active Range of Motion:   Ankle Right Left   DF 20 10   Plantarflexion 60 50   Inversion 35 25*   Eversion 20 20   1st MTP Extension  70 70      Ankle Passive Range of Motion:   Ankle Right Left   DF (knee extended) 20 10   DF (knee bent) 20 10   Plantarflexion 60 50   Inversion 35 25*   Eversion 25 25   1st MTP  Extension  70 70     Knee Passive      Lower Extremity Strength   Right  Left    Dorsiflexion 5/5 5/5   Plantarflexion (standing) 5/5 4/5   Inversion 5/5 5/5   Eversion 5/5 4/5   Hip extension 4/5 4-/5   PGM 3+/5 3+/5       Special Tests:   Right Left   Anterior Drawer Test - -   Varus Stress Test    - -   Valgus Stress Test  - -   External Rotation Stress Test - -   Compression (Squeeze) Test  - -   Fibularis Subluxation Test  - -   Willis Test           Joint Mobility: hypomobile talocrural joint mobility      Palpation: TTP along ATFL      Neural provocation:  -      Edema:  Girth Measurement Fig-8   Right N/t cm   Left N/t cm             Treatment:     Jake received therapeutic exercises to develop endurance, ROM, and flexibility for 20 minutes including:  Self DF mobilization  Ankle 4 way  Resisted lateral steps   SLS with KB pass    Jake received the following manual therapy techniques: Joint mobilizations were applied to the: L ankle for 10 minutes, including:  Grade III/IV a/p talocrural joint mobs        Time Entry(in minutes):       Assessment & Plan   Renny with complaints of continued L ankle pain  consistent with referring dx limiting ADL's and functional activities Upon evaluation patient presents with decreased ROM, joint mobility and flexibility restrictions, decreased strength and motor control contributing to limited functional status at this time. Patient would benefit from appropriate manual therapy, mobility, flexibility, strengthening and NM re-education in order to address the before-mentioned deficits and return to PLOF with ADL's and tennis participation          The patient's spiritual, cultural, and educational needs were considered, and the patient is agreeable to the plan of care and goals.           Goals:   Active       Ambulation/movement       Patient will walk 2 miles       Start:  06/10/25    Expected End:  08/05/25               Functional outcome       Patient will show a  significant change in FOTO patient-reported outcome tool to demonstrate subjective improvement       Start:  06/10/25    Expected End:  08/05/25               Leisure activities       Patient will participate in tennis activities       Start:  06/10/25    Expected End:  08/05/25               Pain       Patient will report pain of 0/10 demonstrating a reduction of overall pain       Start:  06/10/25    Expected End:  08/05/25               Range of Motion       Patient will achieve left ankle dorsiflexion ROM 20 degrees       Start:  06/10/25    Expected End:  08/05/25            Patient will achieve left ankle plantar flexion ROM 60 degrees       Start:  06/10/25    Expected End:  08/05/25               Strength       Patient will achieve left ankle plantar flexion strength of 5/5       Start:  06/10/25    Expected End:  08/05/25                Ricardo Waters, PT

## 2025-06-10 PROBLEM — M25.673 DECREASED ROM OF ANKLE: Status: ACTIVE | Noted: 2025-06-10

## 2025-06-10 PROBLEM — R29.898 DECREASED STRENGTH OF LOWER EXTREMITY: Status: ACTIVE | Noted: 2025-06-10

## 2025-06-11 ENCOUNTER — OFFICE VISIT (OUTPATIENT)
Dept: ORTHOPEDICS | Facility: CLINIC | Age: 55
End: 2025-06-11
Payer: COMMERCIAL

## 2025-06-11 VITALS — WEIGHT: 201.94 LBS | HEIGHT: 72 IN | BODY MASS INDEX: 27.35 KG/M2

## 2025-06-11 DIAGNOSIS — S93.492A SPRAIN OF ANTERIOR TALOFIBULAR LIGAMENT OF LEFT ANKLE, INITIAL ENCOUNTER: Primary | ICD-10-CM

## 2025-06-11 DIAGNOSIS — S93.412A SPRAIN OF CALCANEOFIBULAR LIGAMENT OF LEFT ANKLE, INITIAL ENCOUNTER: ICD-10-CM

## 2025-06-11 PROCEDURE — 99213 OFFICE O/P EST LOW 20 MIN: CPT | Mod: S$GLB,,, | Performed by: ORTHOPAEDIC SURGERY

## 2025-06-11 PROCEDURE — 99999 PR PBB SHADOW E&M-EST. PATIENT-LVL III: CPT | Mod: PBBFAC,,, | Performed by: ORTHOPAEDIC SURGERY

## 2025-06-11 PROCEDURE — 1159F MED LIST DOCD IN RCRD: CPT | Mod: CPTII,S$GLB,, | Performed by: ORTHOPAEDIC SURGERY

## 2025-06-11 PROCEDURE — 3008F BODY MASS INDEX DOCD: CPT | Mod: CPTII,S$GLB,, | Performed by: ORTHOPAEDIC SURGERY

## 2025-06-11 NOTE — PROGRESS NOTES
[facility], Orthopedics and Sports Medicine  [facility]    Established Patient Office Visit  06/11/2025     Diagnosis:  Left ankle sprain moderate    Subjective:      Jake Wallace is a 54 y.o. male who presents for follow up treatment of the above mentioned diagnosis.  Overall the patient's symptoms are improving.  The patient is currently in physical therapy, which is improving the overall condition.     Outside reports reviewed: historical medical records, office notes, and radiology reports.    Past Medical History:   Diagnosis Date    Atrial fibrillation     Basal cell carcinoma     Generalized anxiety disorder     Hypercholesteremia     Hypogonadism in male     Vitamin D deficiency        Problem List[1]    Past Surgical History:   Procedure Laterality Date    COLONOSCOPY N/A 7/15/2020    Procedure: COLONOSCOPY;  Surgeon: Nitin Raymond MD;  Location: [facility];  Service: Endoscopy;  Laterality: N/A;    CYSTOSCOPY  5/5/2025    Procedure: CYSTOSCOPY;  Surgeon: Gurinder Faust MD;  Location: [address];  Service: Urology;;    NEPHROSCOPY Left 5/5/2025    Procedure: NEPHROSCOPY;  Surgeon: Gurinder Faust MD;  Location: [address];  Service: Urology;  Laterality: Left;    URETEROSCOPIC REMOVAL OF URETERIC CALCULUS Left 5/5/2025    Procedure: REMOVAL, CALCULUS, URETER, URETEROSCOPIC;  Surgeon: Gurinder Faust MD;  Location: [facility] OR [address] [place] [address];  Service: Urology;  Laterality: Left;  STONE EXTRACTION URETER/KIDNEY    URETEROSCOPY Left 5/5/2025    Procedure: URETEROSCOPY;  Surgeon: Gurinder Faust MD;  Location: [address];  Service: Urology;  Laterality: Left;        Current Outpatient Medications   Medication Instructions    acetaminophen (TYLENOL) 1,000 mg, Oral, 3 times daily PRN    clindamycin (CLEOCIN T) 1 % external solution Aaa bumps of scalp or inflammed follicles when flaring    ELIQUIS 5 mg, 2 times daily    HYDROcodone-acetaminophen (NORCO) 5-325  mg per tablet 1 tablet, Oral, Every 6 hours PRN    ibuprofen (ADVIL,MOTRIN) 600 mg, Oral, Every 6 hours PRN    ketoconazole (NIZORAL) 2 % shampoo Wash hair with medicated shampoo at least 2x/week - let sit on scalp at least 5 minutes prior to rinsing    leg brace (ANKLE BRACE) Misc 1 Units, Misc.(Non-Drug; Combo Route), Daily    LORazepam (ATIVAN) 0.5 MG tablet lorazepam 0.5 mg tablet   TAKE 1 TABLET BY MOUTH TWICE A DAY AS NEEDED FOR ANXIETY    metoprolol tartrate (LOPRESSOR) 50 mg, 2 times daily    ondansetron (ZOFRAN) 4 mg, Oral, 3 times daily PRN    oxybutynin (DITROPAN) 5 mg, Oral, 3 times daily PRN    tamsulosin (FLOMAX) 0.4 mg, Oral, After dinner    testosterone (ANDROGEL) 1 % (50 mg/5 gram) GlPk testosterone 50 mg/5 gram (1 %) transdermal gel   APPLY CONTENTS OF ONE PACKET TO SKIN DAILY AS DIRECTED    valACYclovir (VALTREX) 1000 MG tablet valacyclovir 1 gram tablet        Review of patient's allergies indicates:   Allergen Reactions    Penicillins        Social History[2]    Family History   Problem Relation Name Age of Onset    Melanoma Neg Hx           Review of Systems   Constitutional: Negative for chills and fever.   HENT:  Negative for hearing loss.    Eyes:  Negative for blurred vision.   Cardiovascular:  Negative for chest pain.   Respiratory:  Negative for shortness of breath.    Gastrointestinal:  Negative for abdominal pain.   Neurological:  Negative for light-headedness.          Objective:      General    Vitals reviewed.  Constitutional: He is oriented to person, place, and time. He appears well-developed and well-nourished. No distress.   HENT: Mouth/Throat: No oropharyngeal exudate.   Eyes: Right eye exhibits no discharge. Left eye exhibits no discharge.   Cardiovascular:  Normal rate.            Pulmonary/Chest: Breath sounds normal. No respiratory distress.   Neurological: He is alert and oriented to person, place, and time. He has normal reflexes. No cranial nerve deficit. Coordination  normal.   Psychiatric: He has a normal mood and affect. His behavior is normal. Judgment and thought content normal.     General Musculoskeletal Exam   Gait: normal     Right Ankle/Foot Exam     Inspection   Scars: absent  Deformity: absent  Erythema: absent  Bruising: Ankle - absent Foot - absent  Effusion: Ankle - absent Foot - absent  Atrophy: Ankle - absent Foot - absent    Range of Motion   Ankle Joint   Dorsiflexion:  10 normal   Plantar flexion:  35 normal   Subtalar Joint   Inversion:  15 normal   Eversion:  5 normal   Willis Test:  negative  First MTP Joint: normal    Alignment   Knee Alignment: neutral  Hindfoot Alignment: neutral  Midfoot Alignment: normal  Forefoot Alignment: normal    Tests   Anterior drawer: 1+  Varus tilt: 1+  Heel Walk: able to perform  Tiptoe Walk: able to perform  Single Heel Rise: able to perform  External Rotation Test: negative  Squeeze Test: negative    Other   Ankle Crepitus: absent  Foot Crepitus:  absent  Sensation: normal  Peroneal Subluxation: negative    Left Ankle/Foot Exam     Inspection  Deformity: absent  Erythema: absent  Bruising: Ankle - absent Foot - absent  Effusion: Ankle - absent Foot - absent  Atrophy: Ankle - absent Foot - absent  Scars: absent    Tenderness   The patient is tender to palpation of the ATF and CF ligament.    Range of Motion   Ankle Joint  Dorsiflexion:  10 normal   Plantar flexion:  35 normal     Subtalar Joint   Inversion:  15 normal   Eversion:  5 normal   Willis Test:  normal  First MTP Joint: normal    Alignment   Knee Alignment: neutral  Hindfoot Alignment: neutral  Midfoot Alignment: normal  Forefoot Alignment: normal    Tests   Anterior drawer: 1+  Varus tilt: 1+  Heel Walk: able to perform  Tiptoe Walk: able to perform  Single Heel Rise: able to perform  External Rotation Test: negative  Squeeze Test: absent    Other   Foot Crepitus:  absent  Ankle Crepitus: absent  Sensation: normal  Peroneal Subluxation: negative      Muscle  Strength   Right Lower Extremity   Anterior tibial:  5/5   Posterior tibial:  5/5   Gastrocsoleus:  5/5   Peroneal muscle:  5/5   EHL:  5/5  FDL: 5/5  EDL: 5/5  FHL: 5/5  Left Lower Extremity   Anterior tibial:  5/5   Posterior tibial:  5/5   Gastrocsoleus:  5/5   Peroneal muscle:  5/5   EHL:  5/5  FDL: 5/5  EDL: 5/5  FHL: 5/5    Reflexes     Left Side  Achilles:  2+  Post Tibial:  2+  Plantar Reflex: 2+    Right Side   Achilles:  2+  Post Tibial:  2+  Plantar Reflex: 2+    Vascular Exam     Right Pulses  Dorsalis Pedis:      2+  Posterior Tibial:      2+        Left Pulses  Dorsalis Pedis:      2+  Posterior Tibial:      2+        Edema  Right Lower Leg: absent  Left Lower Leg: absent      Imaging:  None     Procedures  none      Assessment:       Jake Wallace is a 54 y.o. male seen in the office today. The primary encounter diagnosis was Sprain of anterior talofibular ligament of left ankle, initial encounter. A diagnosis of Sprain of calcaneofibular ligament of left ankle, initial encounter was also pertinent to this visit.  Non-operative treatment is recommended at this time. Continue with therapy at this time.  Avoid unrestricted sports until follow up.  Continue therapy. The natural history and expected course discussed with patient. Various treatment options were discussed, including their risks and benefits. All of the patient's questions were answered.     Plan:      Physical therapy and rehabilitation treatment.  Tylenol 650mg TID, PRN pain.  Follow up in 6 weeks.          Ramakrishna Iyer IV, MD   of Clinical Orthopedics  Department of Orthopedic Surgery  [facility]  Office: [phone]  Website: [email or web address]    ---------------------------------------             [1]   Patient Active Problem List  Diagnosis    Pre-op testing    Paroxysmal atrial fibrillation    Generalized anxiety disorder    MITRA (obstructive sleep apnea)    Status post  cryoablation of arrhythmia (2013)    Left elbow pain    Chronic left shoulder pain    Urolithiasis    Decreased ROM of ankle    Decreased strength of lower extremity   [2]   Social History  Socioeconomic History    Marital status:    Tobacco Use    Smoking status: Never    Smokeless tobacco: Never   Substance and Sexual Activity    Alcohol use: Yes     Alcohol/week: 14.0 standard drinks of alcohol     Types: 14 Shots of liquor per week     Comment: about 2 drinks per day    Drug use: Never    Sexual activity: Yes     Partners: Female     Social Drivers of Health     Financial Resource Strain: Low Risk  (4/28/2025)    Overall Financial Resource Strain (CARDIA)     Difficulty of Paying Living Expenses: Not hard at all   Food Insecurity: No Food Insecurity (4/28/2025)    Hunger Vital Sign     Worried About Running Out of Food in the Last Year: Never true     Ran Out of Food in the Last Year: Never true   Transportation Needs: No Transportation Needs (4/28/2025)    PRAPARE - Transportation     Lack of Transportation (Medical): No     Lack of Transportation (Non-Medical): No   Physical Activity: Sufficiently Active (4/28/2025)    Exercise Vital Sign     Days of Exercise per Week: 3 days     Minutes of Exercise per Session: 90 min   Stress: Stress Concern Present (4/28/2025)    [employer] - Occupational Stress Questionnaire     Feeling of Stress : To some extent   Housing Stability: Low Risk  (4/28/2025)    Housing Stability Vital Sign     Unable to Pay for Housing in the Last Year: No     Number of Times Moved in the Last Year: 0     Homeless in the Last Year: No

## 2025-06-16 ENCOUNTER — CLINICAL SUPPORT (OUTPATIENT)
Dept: REHABILITATION | Facility: HOSPITAL | Age: 55
End: 2025-06-16
Payer: COMMERCIAL

## 2025-06-16 DIAGNOSIS — M25.672 DECREASED RANGE OF MOTION OF LEFT ANKLE: Primary | ICD-10-CM

## 2025-06-16 DIAGNOSIS — R29.898 DECREASED STRENGTH OF LOWER EXTREMITY: ICD-10-CM

## 2025-06-16 PROCEDURE — 97140 MANUAL THERAPY 1/> REGIONS: CPT | Mod: CQ

## 2025-06-16 PROCEDURE — 97530 THERAPEUTIC ACTIVITIES: CPT | Mod: CQ

## 2025-06-16 PROCEDURE — 97110 THERAPEUTIC EXERCISES: CPT | Mod: CQ

## 2025-06-16 NOTE — PROGRESS NOTES
"Outpatient Rehab    Physical Therapy Visit    Patient Name: Jake Wallace  MRN: [id number]  YOB: 1970  Encounter Date: 6/16/2025    Therapy Diagnosis:   Encounter Diagnoses   Name Primary?    Decreased range of motion of left ankle Yes    Decreased strength of lower extremity      Physician: Ramakrishna Iyer IV, MD    Physician Orders: Eval and Treat  Medical Diagnosis: Sprain of anterior talofibular ligament of left ankle, initial encounter  Visit # / Visits Authorized:  1 / 10  Insurance Authorization Period: 5/28/2025 to 12/31/2025  Date of Evaluation: 6/4/2025  Plan of Care Certification: 6/10/2025 to 8/5/2025      Time In: 0706   Time Out: 0800  Total Time: 54 minutes  Total Billable Time: 52 minutes    FOTO:  Intake Score:  %  Survey Score 2:  %  Survey Score 3:  %      Subjective   Pt reports minimal ankle soreness upon entry; overall his pain has been well controlled. He only did his HEP a couple times since eval and had difficulty setting up a couple of the exercises..  Pain reported as 1/10.      Objective        Objective Measures updated at progress report unless specified.    Treatment:     Jake received therapeutic exercises to develop strength, endurance, ROM, and flexibility for 14 minutes including:    Gastroc stretch 5 x 20"    4- way ankle GTB x 45 each   1/2 kneeling self DF mob c red powerband 25 x 5"   Pt education: reviewed HEP exercises/frequency    Jake received the following manual therapy techniques: Joint mobilizations were applied for 08 minutes including:    Assessment of ankle ROM  Posterior TC mobs   FMP into DF     Jake participated in neuromuscular re-education activities to improve: Balance, Coordination, Proprioception, and Neuromuscular Control for 00 minutes. The following activities were included:       Jake participated in dynamic functional therapeutic activities to improve functional performance for 30 minutes including:    Upright bike lvl 6 x 8' " for joint mobility/cardiovascular endurance  EV lateral walks GTB x 2 turf laps  SL shuttle press 50# 3 x 10-15  L split squats 2 x 10       Time Entry (in minutes):  Manual Therapy Time Entry: 8  Therapeutic Activity Time Entry: 30  Therapeutic Exercise Time Entry: 14      Assessment & Plan   Assessment: Jake did well today. He presents with DF ROM limitations; min improvements noted p manual therapy and self mobs. Fatigue reported during resisted lateral walks and split squats. Reinforced HEP and encouraged more consistent compliance; pt voiced understanding. No adverse effects reported p tx.        The patient will continue to benefit from skilled outpatient physical therapy in order to address the deficits listed in the problem list on the initial evaluation, provide patient and family education, and maximize the patients level of independence in the home and community environments.     The patient's spiritual, cultural, and educational needs were considered, and the patient is agreeable to the plan of care and goals.       Plan: Continue current POC.    Goals:   Active       Ambulation/movement       Patient will walk 2 miles       Start:  06/10/25    Expected End:  08/05/25               Functional outcome       Patient will show a significant change in FOTO patient-reported outcome tool to demonstrate subjective improvement       Start:  06/10/25    Expected End:  08/05/25               Leisure activities       Patient will participate in tennis activities       Start:  06/10/25    Expected End:  08/05/25               Pain       Patient will report pain of 0/10 demonstrating a reduction of overall pain       Start:  06/10/25    Expected End:  08/05/25               Range of Motion       Patient will achieve left ankle dorsiflexion ROM 20 degrees       Start:  06/10/25    Expected End:  08/05/25            Patient will achieve left ankle plantar flexion ROM 60 degrees       Start:  06/10/25    Expected  End:  08/05/25               Strength       Patient will achieve left ankle plantar flexion strength of 5/5       Start:  06/10/25    Expected End:  08/05/25                Lydia Evans PTA

## 2025-06-23 ENCOUNTER — CLINICAL SUPPORT (OUTPATIENT)
Dept: REHABILITATION | Facility: HOSPITAL | Age: 55
End: 2025-06-23
Payer: COMMERCIAL

## 2025-06-23 DIAGNOSIS — R29.898 DECREASED STRENGTH OF LOWER EXTREMITY: ICD-10-CM

## 2025-06-23 DIAGNOSIS — M25.672 DECREASED RANGE OF MOTION OF LEFT ANKLE: Primary | ICD-10-CM

## 2025-06-23 PROCEDURE — 97530 THERAPEUTIC ACTIVITIES: CPT

## 2025-06-23 PROCEDURE — 97112 NEUROMUSCULAR REEDUCATION: CPT

## 2025-06-23 NOTE — PROGRESS NOTES
"Outpatient Rehab    Physical Therapy Visit    Patient Name: Jake Wallace  MRN: [id number]  YOB: 1970  Encounter Date: 2025    Therapy Diagnosis:   Encounter Diagnoses   Name Primary?    Decreased range of motion of left ankle Yes    Decreased strength of lower extremity        Physician: Ramakrishna Iyer IV, MD    Physician Orders: Eval and Treat  Medical Diagnosis: Sprain of anterior talofibular ligament of left ankle, initial encounter  Visit # / Visits Authorized:  3 / 10  Insurance Authorization Period: 2025 to 2025  Date of Evaluation: 2025  Plan of Care Certification: 6/10/2025 to 2025      Time In:     Time Out:    Total Time:   minutes  Total Billable Time:   minutes    FOTO:  Intake Score:  %  Survey Score 2:  %  Survey Score 3:  %      Subjective             Objective        Objective Measures updated at progress report unless specified.    Treatment:     Jake received therapeutic exercises to develop strength, endurance, ROM, and flexibility for 5 minutes includin/2 kneeling self DF mob c black powerband 25 x 5"       Not today  Pt education: reviewed HEP exercises/frequency  Gastroc stretch 5 x 20"    4- way ankle GTB x 45 eac    Jake received the following manual therapy techniques: Joint mobilizations were applied for 05 minutes including:  Assessment of ankle ROM  Posterior TC mobs   Grade V HVLA talocrural joint manipulation  Subtalar joint mobs      Not today:  FMP into DF     Jake participated in neuromuscular re-education activities to improve: Balance, Coordination, Proprioception, and Neuromuscular Control for 25 minutes. The following activities were included:  Resisted lateral step YTB at midfoot x 3 turf laps  6" lateral step down 3x10B  Sneaky lunge 3x6B # 3" holds       Jake participated in dynamic functional therapeutic activities to improve functional performance for 25 minutes including:  Upright bike lvl 6 x 8' for joint " mobility/cardiovascular endurance  SL RDL 15# KB 3x8B      Not today  SL shuttle press 50# 3 x 10-15  L split squats 2 x 10       Time Entry (in minutes):         Assessment & Plan   Assessment:         The patient will continue to benefit from skilled outpatient physical therapy in order to address the deficits listed in the problem list on the initial evaluation, provide patient and family education, and maximize the patients level of independence in the home and community environments.     The patient's spiritual, cultural, and educational needs were considered, and the patient is agreeable to the plan of care and goals.       Plan:      Goals:   Active       Ambulation/movement       Patient will walk 2 miles       Start:  06/10/25    Expected End:  08/05/25               Functional outcome       Patient will show a significant change in FOTO patient-reported outcome tool to demonstrate subjective improvement       Start:  06/10/25    Expected End:  08/05/25               Leisure activities       Patient will participate in tennis activities       Start:  06/10/25    Expected End:  08/05/25               Pain       Patient will report pain of 0/10 demonstrating a reduction of overall pain       Start:  06/10/25    Expected End:  08/05/25               Range of Motion       Patient will achieve left ankle dorsiflexion ROM 20 degrees       Start:  06/10/25    Expected End:  08/05/25            Patient will achieve left ankle plantar flexion ROM 60 degrees       Start:  06/10/25    Expected End:  08/05/25               Strength       Patient will achieve left ankle plantar flexion strength of 5/5       Start:  06/10/25    Expected End:  08/05/25                  Ricardo Waters, PT

## 2025-06-30 ENCOUNTER — CLINICAL SUPPORT (OUTPATIENT)
Dept: REHABILITATION | Facility: HOSPITAL | Age: 55
End: 2025-06-30
Payer: COMMERCIAL

## 2025-06-30 DIAGNOSIS — R29.898 DECREASED STRENGTH OF LOWER EXTREMITY: ICD-10-CM

## 2025-06-30 DIAGNOSIS — M25.672 DECREASED RANGE OF MOTION OF LEFT ANKLE: Primary | ICD-10-CM

## 2025-06-30 PROCEDURE — 97110 THERAPEUTIC EXERCISES: CPT | Mod: CQ

## 2025-06-30 PROCEDURE — 97112 NEUROMUSCULAR REEDUCATION: CPT | Mod: CQ

## 2025-06-30 PROCEDURE — 97140 MANUAL THERAPY 1/> REGIONS: CPT | Mod: CQ

## 2025-06-30 PROCEDURE — 97530 THERAPEUTIC ACTIVITIES: CPT | Mod: CQ

## 2025-06-30 NOTE — PROGRESS NOTES
"Outpatient Rehab    Physical Therapy Visit    Patient Name: Jake Wallace  MRN: [id number]  YOB: 1970  Encounter Date: 6/30/2025    Therapy Diagnosis:   Encounter Diagnoses   Name Primary?    Decreased range of motion of left ankle Yes    Decreased strength of lower extremity        Physician: Ramakrishna Iyer IV, MD    Physician Orders: Eval and Treat  Medical Diagnosis: Sprain of anterior talofibular ligament of left ankle, initial encounter  Visit # / Visits Authorized:  3 / 10  Insurance Authorization Period: 5/28/2025 to 12/31/2025  Date of Evaluation: 6/4/2025  Plan of Care Certification: 6/10/2025 to 8/5/2025      Time In: 0705   Time Out: 0803  Total Time: 58 minutes  Total Billable Time: 32 minutes    FOTO:  6/4 Intake Score: 69%  Survey Score 2:  %  Survey Score 3:  %  Survey Goal: 84%      Subjective   Pt reports minimal ankle tenderness upon entry; he played tennis yesterday. Reports noncompliance with HEP..         Objective        Objective Measures updated at progress report unless specified.    Treatment:     Jake received therapeutic exercises to develop strength, endurance, ROM, and flexibility for 08 minutes including:    Gastroc stretch 5 x 20"   1/2 kneeling self DF mob c black powerband 25 x 5"     Jake received the following manual therapy techniques: Joint mobilizations were applied for 08 minutes including:    Assessment of ankle ROM  Posterior TC mobs   FMP into DF   Grade V HVLA talocrural joint manipulation - np  Subtalar joint mobs    Jake participated in neuromuscular re-education activities to improve: Balance, Coordination, Proprioception, and Neuromuscular Control for 17 minutes. The following activities were included:    Sneaky lunge 2 x 10 x 3" ana cristina     Circuit: 3 rounds  6in lateral heel tap c HHA x 10 ana cristina   Seated soleus raise 26#kb 10 x 3" ana cristina      Jake participated in dynamic functional therapeutic activities to improve functional performance for 23 " minutes including:    Upright bike lvl 6 x 8' for joint mobility/cardiovascular endurance  Lateral walks BTB x 1 turf lap  Split squats 2 x 10 ana cristina     NP:  SL RDL 15# KB 3x8B  SL shuttle press 50# 3 x 10-15         Time Entry (in minutes):  Manual Therapy Time Entry: 8  Neuromuscular Re-Education Time Entry: 17  Therapeutic Activity Time Entry: 23  Therapeutic Exercise Time Entry: 8      Assessment & Plan   Assessment: Jake did well today. He presents with DF ROM limitations; min improvements noted p manual therapy and self mobs. Pt demonstrates poor hip control during lateral heel taps and split squats. He could benefit from further hip ABD/ER strengthening to address these deficits. Reinforced HEP and encouraged more consistent compliance; pt voiced understanding. No adverse effects reported p tx.        The patient will continue to benefit from skilled outpatient physical therapy in order to address the deficits listed in the problem list on the initial evaluation, provide patient and family education, and maximize the patients level of independence in the home and community environments.     The patient's spiritual, cultural, and educational needs were considered, and the patient is agreeable to the plan of care and goals.       Plan: Continue current POC.    Goals:   Active       Ambulation/movement       Patient will walk 2 miles       Start:  06/10/25    Expected End:  08/05/25               Functional outcome       Patient will show a significant change in FOTO patient-reported outcome tool to demonstrate subjective improvement       Start:  06/10/25    Expected End:  08/05/25               Leisure activities       Patient will participate in tennis activities       Start:  06/10/25    Expected End:  08/05/25               Pain       Patient will report pain of 0/10 demonstrating a reduction of overall pain       Start:  06/10/25    Expected End:  08/05/25               Range of Motion       Patient will  achieve left ankle dorsiflexion ROM 20 degrees       Start:  06/10/25    Expected End:  08/05/25            Patient will achieve left ankle plantar flexion ROM 60 degrees       Start:  06/10/25    Expected End:  08/05/25               Strength       Patient will achieve left ankle plantar flexion strength of 5/5       Start:  06/10/25    Expected End:  08/05/25                  Lydia Evans PTA

## 2025-07-21 DIAGNOSIS — M25.572 LEFT ANKLE PAIN, UNSPECIFIED CHRONICITY: Primary | ICD-10-CM

## 2025-07-24 ENCOUNTER — HOSPITAL ENCOUNTER (OUTPATIENT)
Dept: RADIOLOGY | Facility: HOSPITAL | Age: 55
Discharge: HOME | End: 2025-07-24
Attending: ORTHOPAEDIC SURGERY
Payer: COMMERCIAL

## 2025-07-24 ENCOUNTER — OFFICE VISIT (OUTPATIENT)
Dept: ORTHOPEDICS | Facility: CLINIC | Age: 55
End: 2025-07-24
Attending: ORTHOPAEDIC SURGERY
Payer: COMMERCIAL

## 2025-07-24 VITALS — HEIGHT: 72 IN | WEIGHT: 201.94 LBS | BODY MASS INDEX: 27.35 KG/M2

## 2025-07-24 DIAGNOSIS — S93.412A SPRAIN OF CALCANEOFIBULAR LIGAMENT OF LEFT ANKLE, INITIAL ENCOUNTER: ICD-10-CM

## 2025-07-24 DIAGNOSIS — S93.492A SPRAIN OF ANTERIOR TALOFIBULAR LIGAMENT OF LEFT ANKLE, INITIAL ENCOUNTER: Primary | ICD-10-CM

## 2025-07-24 DIAGNOSIS — M25.572 LEFT ANKLE PAIN, UNSPECIFIED CHRONICITY: ICD-10-CM

## 2025-07-24 PROCEDURE — 1159F MED LIST DOCD IN RCRD: CPT | Mod: CPTII,S$GLB,, | Performed by: ORTHOPAEDIC SURGERY

## 2025-07-24 PROCEDURE — 99999 PR PBB SHADOW E&M-EST. PATIENT-LVL III: CPT | Mod: PBBFAC,,, | Performed by: ORTHOPAEDIC SURGERY

## 2025-07-24 PROCEDURE — 73610 X-RAY EXAM OF ANKLE: CPT | Mod: 26,LT,, | Performed by: RADIOLOGY

## 2025-07-24 PROCEDURE — 73610 X-RAY EXAM OF ANKLE: CPT | Mod: TC,LT

## 2025-07-24 PROCEDURE — 3008F BODY MASS INDEX DOCD: CPT | Mod: CPTII,S$GLB,, | Performed by: ORTHOPAEDIC SURGERY

## 2025-07-24 PROCEDURE — 99213 OFFICE O/P EST LOW 20 MIN: CPT | Mod: S$GLB,,, | Performed by: ORTHOPAEDIC SURGERY

## 2025-07-24 NOTE — PROGRESS NOTES
[facility], Orthopedics and Sports Medicine  [facility]    Established Patient Office Visit  07/24/2025     Diagnosis:  Left ankle sprain moderate    Date of Injury:   5/27/2025    Subjective:      Jake Wallace is a 54 y.o. male who presents for follow up treatment of the above mentioned diagnosis.  Overall the patient's symptoms are improving.  The patient finished physical therapy, which did improve the overall condition.     Has played tennis doubles, no pain during.  Wears ankle lace up brace while playing.     Outside reports reviewed: historical medical records, office notes, and radiology reports.    Past Medical History:   Diagnosis Date    Atrial fibrillation     Basal cell carcinoma     Generalized anxiety disorder     Hypercholesteremia     Hypogonadism in male     Vitamin D deficiency        Problem List[1]    Past Surgical History:   Procedure Laterality Date    COLONOSCOPY N/A 7/15/2020    Procedure: COLONOSCOPY;  Surgeon: Nitin Raymond MD;  Location: [facility];  Service: Endoscopy;  Laterality: N/A;    CYSTOSCOPY  5/5/2025    Procedure: CYSTOSCOPY;  Surgeon: Gurinder Faust MD;  Location: [address];  Service: Urology;;    NEPHROSCOPY Left 5/5/2025    Procedure: NEPHROSCOPY;  Surgeon: Gurinder Faust MD;  Location: [address];  Service: Urology;  Laterality: Left;    URETEROSCOPIC REMOVAL OF URETERIC CALCULUS Left 5/5/2025    Procedure: REMOVAL, CALCULUS, URETER, URETEROSCOPIC;  Surgeon: Gurinder Faust MD;  Location: [address];  Service: Urology;  Laterality: Left;  STONE EXTRACTION URETER/KIDNEY    URETEROSCOPY Left 5/5/2025    Procedure: URETEROSCOPY;  Surgeon: Gurinder Faust MD;  Location: [address];  Service: Urology;  Laterality: Left;        Current Outpatient Medications   Medication Instructions    acetaminophen (TYLENOL) 1,000 mg, Oral, 3 times daily PRN    clindamycin (CLEOCIN T) 1 % external solution Aaa bumps of  scalp or inflammed follicles when flaring    ELIQUIS 5 mg, 2 times daily    HYDROcodone-acetaminophen (NORCO) 5-325 mg per tablet 1 tablet, Oral, Every 6 hours PRN    ibuprofen (ADVIL,MOTRIN) 600 mg, Oral, Every 6 hours PRN    ketoconazole (NIZORAL) 2 % shampoo Wash hair with medicated shampoo at least 2x/week - let sit on scalp at least 5 minutes prior to rinsing    leg brace (ANKLE BRACE) Misc 1 Units, Misc.(Non-Drug; Combo Route), Daily    LORazepam (ATIVAN) 0.5 MG tablet lorazepam 0.5 mg tablet   TAKE 1 TABLET BY MOUTH TWICE A DAY AS NEEDED FOR ANXIETY    metoprolol tartrate (LOPRESSOR) 50 mg, 2 times daily    ondansetron (ZOFRAN) 4 mg, Oral, 3 times daily PRN    oxybutynin (DITROPAN) 5 mg, Oral, 3 times daily PRN    tamsulosin (FLOMAX) 0.4 mg, Oral, After dinner    testosterone (ANDROGEL) 1 % (50 mg/5 gram) GlPk testosterone 50 mg/5 gram (1 %) transdermal gel   APPLY CONTENTS OF ONE PACKET TO SKIN DAILY AS DIRECTED    valACYclovir (VALTREX) 1000 MG tablet valacyclovir 1 gram tablet        Review of patient's allergies indicates:   Allergen Reactions    Penicillins        Social History[2]    Family History   Problem Relation Name Age of Onset    Melanoma Neg Hx           Review of Systems   Constitutional: Negative for chills and fever.   HENT:  Negative for hearing loss.    Eyes:  Negative for blurred vision.   Cardiovascular:  Negative for chest pain.   Respiratory:  Negative for shortness of breath.    Gastrointestinal:  Negative for abdominal pain.   Neurological:  Negative for light-headedness.          Objective:      General    Vitals reviewed.  Constitutional: He is oriented to person, place, and time. He appears well-developed and well-nourished. No distress.   HENT: Mouth/Throat: No oropharyngeal exudate.   Eyes: Right eye exhibits no discharge. Left eye exhibits no discharge.   Cardiovascular:  Normal rate.            Pulmonary/Chest: Breath sounds normal. No respiratory distress.   Neurological: He  is alert and oriented to person, place, and time. He has normal reflexes. No cranial nerve deficit. Coordination normal.   Psychiatric: He has a normal mood and affect. His behavior is normal. Judgment and thought content normal.     General Musculoskeletal Exam   Gait: normal     Right Ankle/Foot Exam     Inspection   Scars: absent  Deformity: absent  Erythema: absent  Bruising: Ankle - absent Foot - absent  Effusion: Ankle - absent Foot - absent  Atrophy: Ankle - absent Foot - absent    Range of Motion   Ankle Joint   Dorsiflexion:  10 normal   Plantar flexion:  35 normal   Subtalar Joint   Inversion:  15 normal   Eversion:  5 normal   Willis Test:  negative  First MTP Joint: normal    Alignment   Knee Alignment: neutral  Hindfoot Alignment: neutral  Midfoot Alignment: normal  Forefoot Alignment: normal    Tests   Anterior drawer: 1+  Varus tilt: 1+  Heel Walk: able to perform  Tiptoe Walk: able to perform  Single Heel Rise: able to perform  External Rotation Test: negative  Squeeze Test: negative    Other   Ankle Crepitus: absent  Foot Crepitus:  absent  Sensation: normal  Peroneal Subluxation: negative    Left Ankle/Foot Exam     Inspection  Deformity: absent  Erythema: absent  Bruising: Ankle - absent Foot - absent  Effusion: Ankle - absent Foot - absent  Atrophy: Ankle - absent Foot - absent  Scars: absent    Tenderness   The patient is tender to palpation of the ATF and CF ligament.    Range of Motion   Ankle Joint  Dorsiflexion:  10 normal   Plantar flexion:  35 normal     Subtalar Joint   Inversion:  15 normal   Eversion:  5 normal   Willis Test:  normal  First MTP Joint: normal    Alignment   Knee Alignment: neutral  Hindfoot Alignment: neutral  Midfoot Alignment: normal  Forefoot Alignment: normal    Tests   Anterior drawer: 1+  Varus tilt: 1+  Heel Walk: able to perform  Tiptoe Walk: able to perform  Single Heel Rise: able to perform  External Rotation Test: negative  Squeeze Test: absent    Other    Foot Crepitus:  absent  Ankle Crepitus: absent  Sensation: normal  Peroneal Subluxation: negative      Muscle Strength   Right Lower Extremity   Anterior tibial:  5/5   Posterior tibial:  5/5   Gastrocsoleus:  5/5   Peroneal muscle:  5/5   EHL:  5/5  FDL: 5/5  EDL: 5/5  FHL: 5/5  Left Lower Extremity   Anterior tibial:  5/5   Posterior tibial:  5/5   Gastrocsoleus:  5/5   Peroneal muscle:  5/5   EHL:  5/5  FDL: 5/5  EDL: 5/5  FHL: 5/5    Reflexes     Left Side  Achilles:  2+  Post Tibial:  2+  Plantar Reflex: 2+    Right Side   Achilles:  2+  Post Tibial:  2+  Plantar Reflex: 2+    Vascular Exam     Right Pulses  Dorsalis Pedis:      2+  Posterior Tibial:      2+        Left Pulses  Dorsalis Pedis:      2+  Posterior Tibial:      2+        Edema  Right Lower Leg: absent  Left Lower Leg: absent      Imaging:  Radiographs of the left ankle taken 07/24/2025 were personally reviewed from the [facility] EMR. Multiple views of the left ankle are available today for review, including an AP, mortise, and lateral view.  The mortise joint demonstrates minimal degenerative changes.  The ankle mortise is symmetric with no medial clear space widening.      Procedures        Assessment:       Jake Wallace is a 54 y.o. male seen in the office today. The primary encounter diagnosis was Sprain of anterior talofibular ligament of left ankle, initial encounter. A diagnosis of Sprain of calcaneofibular ligament of left ankle, initial encounter was also pertinent to this visit.  Non-operative treatment is recommended at this time. Doing well overall with the ankle.  Can return to all activity. Use the ankle brace for 1 more month while playing sports then discontinue.  Can discontinue brace during other normal non sports activity now otherwise. If pain returns consider returning to therapy.  The natural history and expected course discussed with patient. Various treatment options were discussed, including their risks and  benefits. All of the patient's questions were answered.     Plan:      Tylenol 650mg TID, PRN pain.  Follow up in 3 months or as  needed if symptoms worsen.         Ramakrishna Iyer IV, MD   of Clinical Orthopedics  Department of Orthopedic Surgery  [facility]  Office: [phone]  Website: [email or web address]    ---------------------------------------             [1]   Patient Active Problem List  Diagnosis    Pre-op testing    Paroxysmal atrial fibrillation    Generalized anxiety disorder    MITRA (obstructive sleep apnea)    Status post cryoablation of arrhythmia (2013)    Left elbow pain    Chronic left shoulder pain    Urolithiasis    Decreased ROM of ankle    Decreased strength of lower extremity   [2]   Social History  Socioeconomic History    Marital status:    Tobacco Use    Smoking status: Never    Smokeless tobacco: Never   Substance and Sexual Activity    Alcohol use: Yes     Alcohol/week: 14.0 standard drinks of alcohol     Types: 14 Shots of liquor per week     Comment: about 2 drinks per day    Drug use: Never    Sexual activity: Yes     Partners: Female     Social Drivers of Health     Financial Resource Strain: Low Risk  (4/28/2025)    Overall Financial Resource Strain (CARDIA)     Difficulty of Paying Living Expenses: Not hard at all   Food Insecurity: No Food Insecurity (4/28/2025)    Hunger Vital Sign     Worried About Running Out of Food in the Last Year: Never true     Ran Out of Food in the Last Year: Never true   Transportation Needs: No Transportation Needs (4/28/2025)    PRAPARE - Transportation     Lack of Transportation (Medical): No     Lack of Transportation (Non-Medical): No   Physical Activity: Sufficiently Active (4/28/2025)    Exercise Vital Sign     Days of Exercise per Week: 3 days     Minutes of Exercise per Session: 90 min   Stress: Stress Concern Present (4/28/2025)    [employer] - Occupational  Stress Questionnaire     Feeling of Stress : To some extent   Housing Stability: Low Risk  (4/28/2025)    Housing Stability Vital Sign     Unable to Pay for Housing in the Last Year: No     Number of Times Moved in the Last Year: 0     Homeless in the Last Year: No

## 2025-08-04 ENCOUNTER — HOSPITAL ENCOUNTER (OUTPATIENT)
Dept: RADIOLOGY | Facility: HOSPITAL | Age: 55
Discharge: HOME | End: 2025-08-04
Attending: STUDENT IN AN ORGANIZED HEALTH CARE EDUCATION/TRAINING PROGRAM
Payer: COMMERCIAL

## 2025-08-04 DIAGNOSIS — N13.2 URETERAL STONE WITH HYDRONEPHROSIS: ICD-10-CM

## 2025-08-04 PROCEDURE — 76775 US EXAM ABDO BACK WALL LIM: CPT | Mod: TC

## 2025-08-04 PROCEDURE — 76775 US EXAM ABDO BACK WALL LIM: CPT | Mod: 26,,, | Performed by: RADIOLOGY

## 2025-08-05 ENCOUNTER — OFFICE VISIT (OUTPATIENT)
Dept: UROLOGY | Facility: CLINIC | Age: 55
End: 2025-08-05
Payer: COMMERCIAL

## 2025-08-05 VITALS
SYSTOLIC BLOOD PRESSURE: 108 MMHG | WEIGHT: 208.75 LBS | HEART RATE: 61 BPM | BODY MASS INDEX: 28.27 KG/M2 | DIASTOLIC BLOOD PRESSURE: 74 MMHG | HEIGHT: 72 IN

## 2025-08-05 DIAGNOSIS — N20.0 KIDNEY STONES: Primary | ICD-10-CM

## 2025-08-05 DIAGNOSIS — N28.1 RENAL CYST: ICD-10-CM

## 2025-08-05 PROCEDURE — 3008F BODY MASS INDEX DOCD: CPT | Mod: CPTII,S$GLB,, | Performed by: UROLOGY

## 2025-08-05 PROCEDURE — 1160F RVW MEDS BY RX/DR IN RCRD: CPT | Mod: CPTII,S$GLB,, | Performed by: UROLOGY

## 2025-08-05 PROCEDURE — 3074F SYST BP LT 130 MM HG: CPT | Mod: CPTII,S$GLB,, | Performed by: UROLOGY

## 2025-08-05 PROCEDURE — 99999 PR PBB SHADOW E&M-EST. PATIENT-LVL III: CPT | Mod: PBBFAC,,, | Performed by: UROLOGY

## 2025-08-05 PROCEDURE — 3078F DIAST BP <80 MM HG: CPT | Mod: CPTII,S$GLB,, | Performed by: UROLOGY

## 2025-08-05 PROCEDURE — 99214 OFFICE O/P EST MOD 30 MIN: CPT | Mod: S$GLB,,, | Performed by: UROLOGY

## 2025-08-05 PROCEDURE — 1159F MED LIST DOCD IN RCRD: CPT | Mod: CPTII,S$GLB,, | Performed by: UROLOGY

## 2025-08-05 NOTE — PROGRESS NOTES
Subjective:       Patient ID: Jake Wallace is a 54 y.o. male.    Chief Complaint:  kidney stones      History of Present Illness  HPI  Patient is a 54 y.o. male who is new to our clinic and referred by the ED, Dr. Pickering for evaluation of kidney stones.   Patient originally presented on 4/29/25 left-sided flank pain and groin pain with urinary symptoms He ultimately underwent a left ureteroscopy on 5/5/25.   No stent was placed.  Returns today for post-op renal US.  History of Present Illness    Patient presents today for follow up after left ureteroscopy for kidney stones. He experienced his first occurrence of kidney stones with three episodes of pain over approximately 1-2 months before seeking medical evaluation. Previous imaging revealed a 4 mm distal left ureteral stone and non-obstructing kidney stones. He underwent ureteroscopy on May 5th to remove the left ureteral stone and two stones in the left kidney without stent placement. Recent ultrasound confirmed a 3 mm non-obstructing left renal stone and a small, simple right renal cyst. He reports occasional burning with urination over the past 3 months, occurring approximately 3 times. He describes a mild burning sensation during urination and having to push to complete voiding. He denies any additional urinary symptoms or concerns. Stone analysis revealed calcium oxalate composition, which is the most common type of kidney stone. He has a family history of kidney stones, with both his mother and daughter having experienced similar conditions.             Ultrasound of the kidneys from 8/4/25 was independently reviewed today and reveals possible 3mm left renal stone, right renal cyst, no hydronephrosis.         Review of Systems  Review of Systems  All other systems reviewed and negative except pertinent positives noted in HPI.       Objective:     Physical Exam  Constitutional:       General: He is not in acute distress.     Appearance: He is  well-developed.   HENT:      Head: Normocephalic and atraumatic.   Eyes:      General: No scleral icterus.  Neck:      Trachea: No tracheal deviation.   Pulmonary:      Effort: Pulmonary effort is normal. No respiratory distress.   Neurological:      Mental Status: He is alert and oriented to person, place, and time.   Psychiatric:         Behavior: Behavior normal.         Thought Content: Thought content normal.         Judgment: Judgment normal.         Lab Review  Lab Results   Component Value Date    COLORU Colorless (A) 04/29/2025    SPECGRAV <1.005 (A) 04/29/2025    PHUR 7.0 04/29/2025    NITRITE Negative 04/29/2025    UROBILINOGEN Negative 04/29/2025         Assessment:        1. Kidney stones    2. Renal cyst              Plan:     Kidney stones    Renal cyst      -General risk factors for kidney stones and the conservative measures to prevent kidney stones in the future were discussed with the patient in detail.  The patient was encouraged to drink 2-3 liters of water a day, limit iced tea and vaughn as well as foods high in oxalate.  They were cautioned to try to limit salt and red meat intake.  We also discussed adding citrate to the diet with the addition of rekha or lemon juice to their water or alternatively with crystal light.     --imaging reviewed as per HPI.     Interpretation SEE COMMENTS   Comment: 90% Calcium oxalate dihydrate.  10% Calcium phosphate  (apatite).       Assessment & Plan    N20.1 Calculus of ureter  N20.0 Calculus of kidney  N28.1 Cyst of kidney, acquired  R30.0 Dysuria  Z87.442 Personal history of urinary calculi  Z84.2 Family history of other diseases of the genitourinary system    CALCULUS OF URETER AND KIDNEY:  - Reviewed CT Left Ureter showing 4 mm distal left ureteral stone and non-obstructing stones in the kidney.  - Performed ureteroscopy on May 5th, removing stone in left ureter and two stones in left kidney without need for stent placement.  - Assessed US results: 3 mm  non-obstructing left renal stone, small simple right renal cyst (clinically insignificant), and no hydronephrosis.  - Analyzed stone composition: primarily calcium oxalate.  - Explained ultrasound's primary purpose is to detect hydronephrosis, not necessarily residual stones.  - Informed about calcium oxalate being the most common type of kidney stone.    CYST OF KIDNEY:  - Assessed US results: 3 mm non-obstructing left renal stone, small simple right renal cyst (clinically insignificant), and no hydronephrosis.    DYSURIA:  - Occasional burning sensation during urination likely normal post-op symptoms, not indicative of UTI.    HISTORY OF URINARY CALCULI:  - First-time stone occurrence does not warrant extensive metabolic workup.  - Discussed high recurrence rate of kidney stones (up to 50%).  - Educated on local climate factors contributing to kidney stone formation (heat, fluid loss).    FAMILY HISTORY OF GENITOURINARY DISEASES:  - Clarified that familial risk of kidney stones exists but is not directly inherited.    PREVENTION AND MANAGEMENT:  - Patient to stay hydrated.  - Patient to maintain low sodium intake.  - Patient should not restrict calcium intake.  - Recommend moderate animal protein intake.  - Patient to supplement with citrate.       This note was generated with the assistance of ambient listening technology. Verbal consent was obtained by the patient and accompanying visitor(s) for the recording of patient appointment to facilitate this note. I attest to having reviewed and edited the generated note for accuracy, though some syntax or spelling errors may persist. Please contact the author of this note for any clarification.

## 2025-08-11 ENCOUNTER — OFFICE VISIT (OUTPATIENT)
Dept: ELECTROPHYSIOLOGY | Facility: CLINIC | Age: 55
End: 2025-08-11
Payer: COMMERCIAL

## 2025-08-11 ENCOUNTER — TELEPHONE (OUTPATIENT)
Dept: ELECTROPHYSIOLOGY | Facility: CLINIC | Age: 55
End: 2025-08-11

## 2025-08-11 VITALS — BODY MASS INDEX: 27.77 KG/M2 | HEIGHT: 72 IN | HEART RATE: 90 BPM | WEIGHT: 205 LBS

## 2025-08-11 DIAGNOSIS — Z86.79 STATUS POST CRYOABLATION OF ARRHYTHMIA: ICD-10-CM

## 2025-08-11 DIAGNOSIS — I48.0 PAROXYSMAL ATRIAL FIBRILLATION: Primary | ICD-10-CM

## 2025-08-11 DIAGNOSIS — Z98.890 STATUS POST CRYOABLATION OF ARRHYTHMIA: ICD-10-CM

## 2025-08-11 DIAGNOSIS — G47.33 OSA (OBSTRUCTIVE SLEEP APNEA): ICD-10-CM

## 2025-08-11 PROCEDURE — 1159F MED LIST DOCD IN RCRD: CPT | Mod: CPTII,95,, | Performed by: NURSE PRACTITIONER

## 2025-08-11 PROCEDURE — 1160F RVW MEDS BY RX/DR IN RCRD: CPT | Mod: CPTII,95,, | Performed by: NURSE PRACTITIONER

## 2025-08-11 PROCEDURE — 98006 SYNCH AUDIO-VIDEO EST MOD 30: CPT | Mod: 95,,, | Performed by: NURSE PRACTITIONER

## 2025-08-12 ENCOUNTER — PATIENT MESSAGE (OUTPATIENT)
Dept: ELECTROPHYSIOLOGY | Facility: CLINIC | Age: 55
End: 2025-08-12
Payer: COMMERCIAL

## 2025-08-12 ENCOUNTER — TELEPHONE (OUTPATIENT)
Dept: ELECTROPHYSIOLOGY | Facility: CLINIC | Age: 55
End: 2025-08-12
Payer: COMMERCIAL

## 2025-08-12 DIAGNOSIS — I48.0 PAROXYSMAL ATRIAL FIBRILLATION: Primary | ICD-10-CM

## 2025-08-12 DIAGNOSIS — G47.33 OSA (OBSTRUCTIVE SLEEP APNEA): ICD-10-CM

## 2025-08-13 ENCOUNTER — LAB VISIT (OUTPATIENT)
Dept: LAB | Facility: HOSPITAL | Age: 55
End: 2025-08-13
Attending: INTERNAL MEDICINE
Payer: COMMERCIAL

## 2025-08-13 DIAGNOSIS — I48.0 PAROXYSMAL ATRIAL FIBRILLATION: ICD-10-CM

## 2025-08-13 DIAGNOSIS — G47.33 OSA (OBSTRUCTIVE SLEEP APNEA): ICD-10-CM

## 2025-08-13 LAB
ANION GAP (OHS): 8 MMOL/L (ref 8–16)
APTT PPP: 28.7 SECONDS (ref 21–32)
BUN SERPL-MCNC: 9 MG/DL (ref 6–20)
CALCIUM SERPL-MCNC: 9.5 MG/DL (ref 8.7–10.5)
CHLORIDE SERPL-SCNC: 107 MMOL/L (ref 95–110)
CO2 SERPL-SCNC: 25 MMOL/L (ref 23–29)
CREAT SERPL-MCNC: 0.9 MG/DL (ref 0.5–1.4)
ERYTHROCYTE [DISTWIDTH] IN BLOOD BY AUTOMATED COUNT: 12.9 % (ref 11.5–14.5)
GFR SERPLBLD CREATININE-BSD FMLA CKD-EPI: >60 ML/MIN/1.73/M2
GLUCOSE SERPL-MCNC: 74 MG/DL (ref 70–110)
HCT VFR BLD AUTO: 46 % (ref 40–54)
HGB BLD-MCNC: 15.7 GM/DL (ref 14–18)
INR PPP: 1 (ref 0.8–1.2)
MCH RBC QN AUTO: 33.5 PG (ref 27–31)
MCHC RBC AUTO-ENTMCNC: 34.1 G/DL (ref 32–36)
MCV RBC AUTO: 98 FL (ref 82–98)
PLATELET # BLD AUTO: 217 K/UL (ref 150–450)
PMV BLD AUTO: 11.1 FL (ref 9.2–12.9)
POTASSIUM SERPL-SCNC: 4.3 MMOL/L (ref 3.5–5.1)
PROTHROMBIN TIME: 10.7 SECONDS (ref 9–12.5)
RBC # BLD AUTO: 4.68 M/UL (ref 4.6–6.2)
SODIUM SERPL-SCNC: 140 MMOL/L (ref 136–145)
WBC # BLD AUTO: 6.83 K/UL (ref 3.9–12.7)

## 2025-08-13 PROCEDURE — 82310 ASSAY OF CALCIUM: CPT

## 2025-08-13 PROCEDURE — 36415 COLL VENOUS BLD VENIPUNCTURE: CPT

## 2025-08-13 PROCEDURE — 85610 PROTHROMBIN TIME: CPT

## 2025-08-13 PROCEDURE — 85027 COMPLETE CBC AUTOMATED: CPT

## 2025-08-13 PROCEDURE — 85730 THROMBOPLASTIN TIME PARTIAL: CPT

## 2025-08-18 ENCOUNTER — TELEPHONE (OUTPATIENT)
Dept: ELECTROPHYSIOLOGY | Facility: CLINIC | Age: 55
End: 2025-08-18
Payer: COMMERCIAL

## 2025-08-19 ENCOUNTER — ANESTHESIA (OUTPATIENT)
Dept: MEDSURG UNIT | Facility: HOSPITAL | Age: 55
End: 2025-08-19
Payer: COMMERCIAL

## 2025-08-19 ENCOUNTER — HOSPITAL ENCOUNTER (OUTPATIENT)
Facility: HOSPITAL | Age: 55
Discharge: HOME | End: 2025-08-19
Attending: INTERNAL MEDICINE | Admitting: INTERNAL MEDICINE
Payer: COMMERCIAL

## 2025-08-19 ENCOUNTER — ANESTHESIA EVENT (OUTPATIENT)
Dept: MEDSURG UNIT | Facility: HOSPITAL | Age: 55
End: 2025-08-19
Payer: COMMERCIAL

## 2025-08-19 VITALS
BODY MASS INDEX: 27.23 KG/M2 | DIASTOLIC BLOOD PRESSURE: 60 MMHG | WEIGHT: 201.06 LBS | RESPIRATION RATE: 16 BRPM | HEIGHT: 72 IN | SYSTOLIC BLOOD PRESSURE: 99 MMHG | OXYGEN SATURATION: 100 % | HEART RATE: 59 BPM | TEMPERATURE: 98 F

## 2025-08-19 DIAGNOSIS — I48.0 PAROXYSMAL ATRIAL FIBRILLATION: ICD-10-CM

## 2025-08-19 DIAGNOSIS — I48.91 ATRIAL FIBRILLATION: ICD-10-CM

## 2025-08-19 DIAGNOSIS — G47.33 OSA (OBSTRUCTIVE SLEEP APNEA): ICD-10-CM

## 2025-08-19 LAB
AORTIC SIZE INDEX (SOV): 1.8 CM/M2
AORTIC SIZE INDEX: 1.6 CM/M2
ASCENDING AORTA: 3.4 CM
BSA FOR ECHO PROCEDURE: 2.15 M2
Lab: 1.9 CM/M
Lab: 2.1 CM/M
OHS CV CPX PATIENT HEIGHT IN: 72
OHS QRS DURATION: 78 MS
OHS QRS DURATION: 78 MS
OHS QTC CALCULATION: 398 MS
OHS QTC CALCULATION: 425 MS
SINUS: 3.8 CM
STJ: 3.3 CM

## 2025-08-19 PROCEDURE — 93005 ELECTROCARDIOGRAM TRACING: CPT

## 2025-08-19 PROCEDURE — 63600175 PHARM REV CODE 636 W HCPCS

## 2025-08-19 PROCEDURE — 93010 ELECTROCARDIOGRAM REPORT: CPT | Mod: ,,, | Performed by: INTERNAL MEDICINE

## 2025-08-19 PROCEDURE — 92960 CARDIOVERSION ELECTRIC EXT: CPT | Performed by: INTERNAL MEDICINE

## 2025-08-19 PROCEDURE — 37000009 HC ANESTHESIA EA ADD 15 MINS: Performed by: INTERNAL MEDICINE

## 2025-08-19 PROCEDURE — 37000008 HC ANESTHESIA 1ST 15 MINUTES: Performed by: INTERNAL MEDICINE

## 2025-08-19 PROCEDURE — 92960 CARDIOVERSION ELECTRIC EXT: CPT | Mod: ,,, | Performed by: INTERNAL MEDICINE

## 2025-08-19 PROCEDURE — 25000003 PHARM REV CODE 250

## 2025-08-19 RX ORDER — PROCHLORPERAZINE EDISYLATE 5 MG/ML
5 INJECTION INTRAMUSCULAR; INTRAVENOUS EVERY 30 MIN PRN
Status: DISCONTINUED | OUTPATIENT
Start: 2025-08-19 | End: 2025-08-19 | Stop reason: HOSPADM

## 2025-08-19 RX ORDER — DIPHENHYDRAMINE HYDROCHLORIDE 50 MG/ML
25 INJECTION, SOLUTION INTRAMUSCULAR; INTRAVENOUS EVERY 6 HOURS PRN
Status: DISCONTINUED | OUTPATIENT
Start: 2025-08-19 | End: 2025-08-19 | Stop reason: HOSPADM

## 2025-08-19 RX ORDER — FENTANYL CITRATE 50 UG/ML
25 INJECTION, SOLUTION INTRAMUSCULAR; INTRAVENOUS EVERY 5 MIN PRN
Status: DISCONTINUED | OUTPATIENT
Start: 2025-08-19 | End: 2025-08-19 | Stop reason: HOSPADM

## 2025-08-19 RX ORDER — PROPOFOL 10 MG/ML
VIAL (ML) INTRAVENOUS
Status: DISCONTINUED | OUTPATIENT
Start: 2025-08-19 | End: 2025-08-19

## 2025-08-19 RX ORDER — METOPROLOL TARTRATE 25 MG/1
25 TABLET, FILM COATED ORAL 2 TIMES DAILY
Qty: 180 TABLET | Refills: 3 | Status: SHIPPED | OUTPATIENT
Start: 2025-08-19

## 2025-08-19 RX ORDER — LIDOCAINE HYDROCHLORIDE 20 MG/ML
INJECTION INTRAVENOUS
Status: DISCONTINUED | OUTPATIENT
Start: 2025-08-19 | End: 2025-08-19

## 2025-08-19 RX ORDER — ONDANSETRON HYDROCHLORIDE 2 MG/ML
4 INJECTION, SOLUTION INTRAVENOUS ONCE AS NEEDED
Status: DISCONTINUED | OUTPATIENT
Start: 2025-08-19 | End: 2025-08-19 | Stop reason: HOSPADM

## 2025-08-19 RX ORDER — HYDROMORPHONE HYDROCHLORIDE 1 MG/ML
0.2 INJECTION, SOLUTION INTRAMUSCULAR; INTRAVENOUS; SUBCUTANEOUS EVERY 5 MIN PRN
Status: DISCONTINUED | OUTPATIENT
Start: 2025-08-19 | End: 2025-08-19 | Stop reason: HOSPADM

## 2025-08-19 RX ADMIN — PROPOFOL 80 MG: 10 INJECTION, EMULSION INTRAVENOUS at 09:08

## 2025-08-19 RX ADMIN — PROPOFOL 175 MCG/KG/MIN: 10 INJECTION, EMULSION INTRAVENOUS at 09:08

## 2025-08-19 RX ADMIN — SODIUM CHLORIDE: 0.9 INJECTION, SOLUTION INTRAVENOUS at 09:08

## 2025-08-19 RX ADMIN — LIDOCAINE HYDROCHLORIDE 100 MG: 20 INJECTION INTRAVENOUS at 09:08

## 2025-08-27 ENCOUNTER — HOSPITAL ENCOUNTER (OUTPATIENT)
Dept: CARDIOLOGY | Facility: CLINIC | Age: 55
Discharge: HOME | End: 2025-08-27
Payer: COMMERCIAL

## 2025-08-27 DIAGNOSIS — I48.0 PAROXYSMAL ATRIAL FIBRILLATION: ICD-10-CM

## 2025-08-27 LAB
OHS QRS DURATION: 92 MS
OHS QTC CALCULATION: 416 MS

## 2025-08-27 PROCEDURE — 93010 ELECTROCARDIOGRAM REPORT: CPT | Mod: S$GLB,,, | Performed by: STUDENT IN AN ORGANIZED HEALTH CARE EDUCATION/TRAINING PROGRAM

## 2025-09-03 ENCOUNTER — TELEPHONE (OUTPATIENT)
Dept: ELECTROPHYSIOLOGY | Facility: CLINIC | Age: 55
End: 2025-09-03
Payer: COMMERCIAL

## (undated) DEVICE — EXTRACTOR TIPLESS 2.4FRX1115CM

## (undated) DEVICE — GOWN X-LG STERILE BACK

## (undated) DEVICE — PACK CYSTOSCOPY III SIRUS

## (undated) DEVICE — TRAY CYSTO BASIN OMC

## (undated) DEVICE — ADAPTER HOSE 10FT 8MM

## (undated) DEVICE — UNDERGLOVES BIOGEL PI SIZE 7.5

## (undated) DEVICE — CATH POLLACK OPEN-END FLEXI-TI

## (undated) DEVICE — GUIDEWIRE STR TIP HIWIRE 150CM

## (undated) DEVICE — PAD DEFIB CADENCE ADULT R2

## (undated) DEVICE — SOL NORMAL USPCA 0.9%

## (undated) DEVICE — GUIDE WIRE MOTION .035 X 150CM

## (undated) DEVICE — SYR ONLY LUER LOCK 20CC